# Patient Record
Sex: FEMALE | Race: WHITE
[De-identification: names, ages, dates, MRNs, and addresses within clinical notes are randomized per-mention and may not be internally consistent; named-entity substitution may affect disease eponyms.]

---

## 2017-02-09 ENCOUNTER — HOSPITAL ENCOUNTER (OUTPATIENT)
Dept: HOSPITAL 53 - M ED | Age: 62
Setting detail: OBSERVATION
LOS: 1 days | Discharge: HOME | End: 2017-02-10
Attending: INTERNAL MEDICINE | Admitting: INTERNAL MEDICINE
Payer: MEDICARE

## 2017-02-09 ENCOUNTER — HOSPITAL ENCOUNTER (OUTPATIENT)
Dept: HOSPITAL 53 - M LAB REF | Age: 62
End: 2017-02-09
Attending: INTERNAL MEDICINE
Payer: MEDICARE

## 2017-02-09 VITALS — DIASTOLIC BLOOD PRESSURE: 73 MMHG | SYSTOLIC BLOOD PRESSURE: 149 MMHG

## 2017-02-09 VITALS — HEIGHT: 65 IN | BODY MASS INDEX: 18.7 KG/M2 | WEIGHT: 112.22 LBS

## 2017-02-09 VITALS — DIASTOLIC BLOOD PRESSURE: 50 MMHG | SYSTOLIC BLOOD PRESSURE: 147 MMHG

## 2017-02-09 VITALS — SYSTOLIC BLOOD PRESSURE: 147 MMHG | DIASTOLIC BLOOD PRESSURE: 50 MMHG

## 2017-02-09 VITALS — SYSTOLIC BLOOD PRESSURE: 155 MMHG | DIASTOLIC BLOOD PRESSURE: 70 MMHG

## 2017-02-09 DIAGNOSIS — I12.9: ICD-10-CM

## 2017-02-09 DIAGNOSIS — N17.9: ICD-10-CM

## 2017-02-09 DIAGNOSIS — Z94.0: ICD-10-CM

## 2017-02-09 DIAGNOSIS — E78.4: ICD-10-CM

## 2017-02-09 DIAGNOSIS — E11.9: ICD-10-CM

## 2017-02-09 DIAGNOSIS — J20.2: Primary | ICD-10-CM

## 2017-02-09 DIAGNOSIS — D63.1: ICD-10-CM

## 2017-02-09 DIAGNOSIS — I48.2: ICD-10-CM

## 2017-02-09 DIAGNOSIS — Z94.0: Primary | ICD-10-CM

## 2017-02-09 DIAGNOSIS — Z79.899: ICD-10-CM

## 2017-02-09 DIAGNOSIS — N18.9: ICD-10-CM

## 2017-02-09 DIAGNOSIS — Z79.4: ICD-10-CM

## 2017-02-09 LAB
ANION GAP SERPL CALC-SCNC: 10 MEQ/L (ref 8–16)
BASOPHILS # BLD AUTO: 0 K/MM3 (ref 0–0.2)
BASOPHILS NFR BLD AUTO: 0.3 % (ref 0–1)
BUN SERPL-MCNC: 23 MG/DL (ref 7–18)
CALCIUM SERPL-MCNC: 9 MG/DL (ref 8.5–10.1)
CHLORIDE SERPL-SCNC: 107 MEQ/L (ref 98–107)
CO2 SERPL-SCNC: 23 MEQ/L (ref 21–32)
CREAT SERPL-MCNC: 1.17 MG/DL (ref 0.55–1.02)
EOSINOPHIL # BLD AUTO: 0.1 K/MM3 (ref 0–0.5)
EOSINOPHIL NFR BLD AUTO: 1.2 % (ref 0–3)
ERYTHROCYTE [DISTWIDTH] IN BLOOD BY AUTOMATED COUNT: 12.9 % (ref 11.5–14.5)
GFR SERPL CREATININE-BSD FRML MDRD: 50.4 ML/MIN/{1.73_M2} (ref 51–?)
GLUCOSE SERPL-MCNC: 226 MG/DL (ref 70–105)
LARGE UNSTAINED CELL #: 0.4 K/MM3 (ref 0–0.4)
LARGE UNSTAINED CELL %: 4.4 % (ref 0–4)
LYMPHOCYTES # BLD AUTO: 3 K/MM3 (ref 1.5–4.5)
LYMPHOCYTES NFR BLD AUTO: 29.4 % (ref 24–44)
MCH RBC QN AUTO: 30.8 PG (ref 27–33)
MCHC RBC AUTO-ENTMCNC: 32 G/DL (ref 32–36.5)
MCV RBC AUTO: 96.4 FL (ref 80–96)
MONOCYTES # BLD AUTO: 0.8 K/MM3 (ref 0–0.8)
MONOCYTES NFR BLD AUTO: 7.9 % (ref 0–5)
NEUTROPHILS # BLD AUTO: 5.7 K/MM3 (ref 1.8–7.7)
NEUTROPHILS NFR BLD AUTO: 56.8 % (ref 36–66)
PLATELET # BLD AUTO: 242 K/MM3 (ref 150–450)
POTASSIUM SERPL-SCNC: 4.7 MEQ/L (ref 3.5–5.1)
SODIUM SERPL-SCNC: 140 MEQ/L (ref 136–145)
WBC # BLD AUTO: 10.1 K/MM3 (ref 4–10)

## 2017-02-09 PROCEDURE — 83735 ASSAY OF MAGNESIUM: CPT

## 2017-02-09 PROCEDURE — 87581 M.PNEUMON DNA AMP PROBE: CPT

## 2017-02-09 PROCEDURE — 87205 SMEAR GRAM STAIN: CPT

## 2017-02-09 PROCEDURE — 71020: CPT

## 2017-02-09 PROCEDURE — 85027 COMPLETE CBC AUTOMATED: CPT

## 2017-02-09 PROCEDURE — 87077 CULTURE AEROBIC IDENTIFY: CPT

## 2017-02-09 PROCEDURE — 87798 DETECT AGENT NOS DNA AMP: CPT

## 2017-02-09 PROCEDURE — 80197 ASSAY OF TACROLIMUS: CPT

## 2017-02-09 PROCEDURE — 94640 AIRWAY INHALATION TREATMENT: CPT

## 2017-02-09 PROCEDURE — 82550 ASSAY OF CK (CPK): CPT

## 2017-02-09 PROCEDURE — 87070 CULTURE OTHR SPECIMN AEROBIC: CPT

## 2017-02-09 PROCEDURE — 87880 STREP A ASSAY W/OPTIC: CPT

## 2017-02-09 PROCEDURE — 87186 SC STD MICRODIL/AGAR DIL: CPT

## 2017-02-09 PROCEDURE — 80048 BASIC METABOLIC PNL TOTAL CA: CPT

## 2017-02-09 PROCEDURE — 87040 BLOOD CULTURE FOR BACTERIA: CPT

## 2017-02-09 PROCEDURE — 87633 RESP VIRUS 12-25 TARGETS: CPT

## 2017-02-09 PROCEDURE — 99284 EMERGENCY DEPT VISIT MOD MDM: CPT

## 2017-02-09 PROCEDURE — 96374 THER/PROPH/DIAG INJ IV PUSH: CPT

## 2017-02-09 PROCEDURE — 71250 CT THORAX DX C-: CPT

## 2017-02-09 PROCEDURE — 82553 CREATINE MB FRACTION: CPT

## 2017-02-09 PROCEDURE — 87804 INFLUENZA ASSAY W/OPTIC: CPT

## 2017-02-09 PROCEDURE — 85025 COMPLETE CBC W/AUTO DIFF WBC: CPT

## 2017-02-09 PROCEDURE — 84484 ASSAY OF TROPONIN QUANT: CPT

## 2017-02-09 PROCEDURE — 83605 ASSAY OF LACTIC ACID: CPT

## 2017-02-09 PROCEDURE — 87486 CHLMYD PNEUM DNA AMP PROBE: CPT

## 2017-02-09 PROCEDURE — 36415 COLL VENOUS BLD VENIPUNCTURE: CPT

## 2017-02-09 RX ADMIN — SODIUM CHLORIDE SCH UNITS: 4.5 INJECTION, SOLUTION INTRAVENOUS at 22:00

## 2017-02-09 RX ADMIN — SODIUM CHLORIDE SCH UNITS: 4.5 INJECTION, SOLUTION INTRAVENOUS at 14:00

## 2017-02-09 RX ADMIN — MAGNESIUM OXIDE SCH MG: 400 TABLET ORAL at 22:07

## 2017-02-09 RX ADMIN — IPRATROPIUM BROMIDE AND ALBUTEROL SULFATE SCH ML: .5; 3 SOLUTION RESPIRATORY (INHALATION) at 19:31

## 2017-02-09 RX ADMIN — IPRATROPIUM BROMIDE AND ALBUTEROL SULFATE SCH ML: .5; 3 SOLUTION RESPIRATORY (INHALATION) at 23:45

## 2017-02-09 RX ADMIN — METOPROLOL TARTRATE SCH MG: 25 TABLET, FILM COATED ORAL at 22:07

## 2017-02-09 RX ADMIN — AMOXICILLIN AND CLAVULANATE POTASSIUM SCH MG: 500; 125 TABLET, FILM COATED ORAL at 22:05

## 2017-02-09 RX ADMIN — ACETAMINOPHEN PRN MG: 325 TABLET ORAL at 22:06

## 2017-02-09 RX ADMIN — Medication SCH EA: at 23:08

## 2017-02-09 RX ADMIN — INSULIN LISPRO SCH UNITS: 100 INJECTION, SOLUTION INTRAVENOUS; SUBCUTANEOUS at 19:05

## 2017-02-09 RX ADMIN — TACROLIMUS SCH MG: 1 CAPSULE ORAL at 22:06

## 2017-02-09 RX ADMIN — IPRATROPIUM BROMIDE AND ALBUTEROL SULFATE SCH ML: .5; 3 SOLUTION RESPIRATORY (INHALATION) at 16:00

## 2017-02-09 RX ADMIN — Medication SCH EA: at 19:25

## 2017-02-09 NOTE — EDDOCDS
Physician Documentation                                                                           

[facility]                                                                         

Name: Liliana Haas                                                                                 

Age: 61 yrs                                                                                       

Sex: Female                                                                                       

: 1955                                                                                   

MRN: [id number]                                                                                     

Arrival Date: 2017                                                                          

Time: 10:11                                                                                       

Account#: [id number]                                                                              

Bed 8                                                                                             

Private MD: Sherry Cook P                                                                     

Disposition:                                                                                      

17 12:33 Hospitalization ordered by Estrada Rich for Inpatient Admission. Preliminary       

diagnosis are Acute pharyngitis, Fever, unspecified.                                            

- Bed requested for 4 [place].                                                                  

- Status is Inpatient Admission.                                                              ml6 

- Condition is Stable.                                                                            

- Problem is new.                                                                                 

- Symptoms have improved.                                                                         

                                                                                                  

                                                                                                  

                                                                                                  

Historical:                                                                                       

- Allergies: Halcion (hallucinations); Nifedipine (heart races); HYDRALAZINE (Unknown);           

- Home Meds:                                                                                      

1. tacrolimus 1 mg oral cap every 12 hours                                                      

     (Last dose: 2017 10:00)                                                                

2. Myfortic 180 mg oral TbEC twice a day                                                        

     (Last dose: 2017 10:00)                                                                

3. prednisone 5 mg Oral tab once daily                                                          

     (Last dose: 2017 10:00)                                                                

4. metoprolol tartrate 25 mg Oral tab 1 tab 2 times per day                                     

     (Last dose: 2017 10:00)                                                                

5. magnesium oxide 400 mg Oral cap 400 mg twice a day                                           

     (Last dose: 2017 10:00)                                                                

6. losartan oral 20 mg oral 1 tab once daily                                                    

     (Last dose: 2017 10:00)                                                                

7. Humalog 100 unit/mL Sub-Q crtg sliding scale TID 7 units at 0720                             

8. Lantus 100 unit/mL Sub-Q soln between 12-14 units at bedtime depending on her FSBS .         

     14 units kast PM                                                                             

- PMHx: Diabetes - IDDM: controlled; Hypertension; kidney transplant; end stage renal             

disease; ruptured pancreas;                                                                     

- PSHx: Rotator Cuff Repair- Left; Kidney Transplant- Left; Kidney Transplant- Right;             

Hip Arthroplasty, Left; Hip Arthroplasty, Right; Cataract Surgery- Bilateral;                   

Cholecystectomy; Splenectomy;                                                                   

- Social history: Smoking status: Patient uses tobacco products, light tobacco smoker.            

No barriers to communication noted, The patient speaks fluent English.                          

- Family history: Not pertinent.                                                                  

- : The pt / caregiver states he / she is not on anticoagulants. Home medication list             

is obtained from the patient.                                                                   

- Exposure Risk Screening:: None identified.                                                      

                                                                                                  

                                                                                                  

Vital Signs:                                                                                      

                                                                                             

10:12  / 97; Pulse 67; Resp 16; Temp 98.7(O); Pulse Ox 98% on R/A; Weight 53.52 kg /    elp 

      117.99 lbs (R); Height 5 ft. 5 in. (165.10 cm) (R);                                         

12:23  / 81; Pulse 62; Resp 18; Temp 98.9(O); Pulse Ox 98% on R/A; Pain 0/10;           srm 

14:19  / 74; Pulse 62; Resp 16; Temp 98.3(O); Pulse Ox 98% on R/A; Pain 0/10;           ml6 

10:12 Body Mass Index 19.64 (53.52 kg, 165.10 cm)                                             elp 

                                                                                                  

MDM:                                                                                              

10:33 -Blood Culture (Adults Only), peripheral from different site, or from device/port/PICC  fg  

      etc. if present ordered.                                                                    

10:33 Obtain sample by nasal aspiration ordered.                                              fg  

10:33 IV Saline Lock ordered.                                                                 fg  

10:35 CBC with Diff Ordered.                                                                  EDMS

10:35 Basic Metabolic Profile Ordered.                                                        EDMS

10:35 -Blood Culture Ordered.                                                                 EDMS

10:35 -Influenza A&B Rapid Antigen - Nose Ordered.                                            EDMS

10:35 Chest, 2 View (pa\E\lat) Ordered.                                                         EDMS

11:03 -Blood Culture (Adults Only), peripheral from different site, or from device/port/PICC  deg 

      etc. if present complete.                                                                   

11:04 BLOOD CULTURES Ordered.                                                                 EDMS

11:40 Strep Screen, Nursing ordered.                                                          fg  

11:51 Financial registration complete.                                                        lg  

12:05 [facility] Payment Agreement was scanned into [employer] and attached to record.               lg  

12:13 azithromycin 500 mg IVPB once over 1 hrs; dilute in 250mL of D5W or NS ordered.         fg  

12:27 Lactic Acid (Gray tube on ice) Ordered.                                                 EDMS

13:24 Admission / Observation Status ordered.                                                 EDMS

13:24 CONSISTENT CARBOHYDRATES ordered.                                                       EDMS

13:24 2 GRAM SODIUM DIET ordered.                                                             EDMS

14:19 CT Chest without contrast Ordered.                                                      EDMS

14:20 RESPIRATORY PANEL Ordered.                                                              EDMS

14:20 SPUTUM CULTURE AND GRAM STAIN Ordered.                                                  EDMS

14:26 CARDIAC INJURY PROFILE Ordered.                                                         EDMS

14:26 CARDIAC INJURY PROFILE Ordered.                                                         EDMS

14:26 TROPONIN Ordered.                                                                       EDMS

14:26 TROPONIN Ordered.                                                                       EDMS

                                                                                                  

Administered Medications:                                                                         

12:23 Drug: azithromycin 500 mg [azithromycin 500 mg intravenous solution] Route: IVPB;       srm 

      Infused Over: 1 hrs; Site: right forearm;                                                   

                                                                                                  

                                                                                                  

Signatures:                                                                                       

Dispatcher [employer]                           EDCheryl Purvis, Unit Clerk              Unit deg                                                  

Deepika Lcuio RN RN kpj Ganter, LoriLee, Reg                    Reg  lg                                                   

Matthew Acuña RN RN   ml6                                                  

Iram Blackwell MD MD                                                      

Zunilda Villa RN   srm                                                  

                                                                                                  

The chart was reviewed and I authenticate all verbal orders and agree with the evaluation and 
treatment provided.Attachments:

12:05 [facility] Payment Agreement                                                                lg  

                                                                                                  

**************************************************************************************************

MTDD

## 2017-02-09 NOTE — REP
CT of the chest without IV contrast:

 

Comparison is the chest CT dated 06/05/2012 as well as the PA and lateral plain

film study of the chest performed earlier today.

 

There are two calcific pleural plaques in the left hemithorax:

One along the left lateral chest wall measuring 5.1 cm craniocaudad by 2.9 cm

transverse,   , not significantly changed from the prior study.

The other is along the posterior lateral left chest wall measuring 4.6 cm

craniocaudad by 4.7 cm transverse, not significantly changed.

 

On both of these pleural masses are calcified with rim calcification and internal

calcification.  Additionally there is low density soft tissues within these

masses measuring 13 HU compatible with fluid, compatible with cysts.

 

On the prior study.  There were bilateral pleural effusions.  These have

resolved.

 

There are linear densities in the lower lobes suggestive of atelectasis versus

scarring.

 

There is a 4 mm pleural-based nodule posteriorly in the right upper lobe on image

38.  This area was obscured by the pleural effusion previously.

 

No other nodules are identified.

 

There are is borderline mediastinal adenopathy.  The azygos node measures 8 mm

diameter of the normal fatty hilus.  This is normal size for this node.

 

There is no axillary adenopathy.  The absence of IV contrast the study is

insensitive for hilar adenopathy.

 

The ascending thoracic aorta measures 4 cm diameter and is mildly dilated.  This

is not significantly changed.  Cardiac size is normal.  There is no pericardial

effusion.

 

The visualized upper abdominal upper abdomen demonstrates marked bilateral renal

cortical atrophy and bilateral renal calculi.  The visualized hepatic parenchyma

is unremarkable.  There is a cholecystectomy.  There are calcifications within

the onofre hepatis, likely within the common duct, unchanged from the prior

study.

 

The patient poorly has a splenectomy.  There is no adrenal mass.

 

Impression:

 

Stable calcified pleural masses on the left as described with low-density

centers.  These could be calcified pleural cysts are to be pleural masses and

cystic degeneration.

 

Linear densities in the lung bases which could represent atelectasis or

scarring.

 

There is a 4 mm pleural-based nodule posteriorly in the right upper lobe on image

38.  Follow-up for a nodules this size in a low risk patient is follow-up CT at

12 months, if no change and no further imaging needed.  For high risk patient

initial follow-up at 6-12 months and 918-24 months.  There are no changes.

 

Cholecystectomy.  Calcifications in the onofre hepatis, possibly within the common

biliary duct, but unchanged from the comparison study.

 

 

 

 

Signed by

Dorian Victor MD 02/09/2017 03:32 P

## 2017-02-09 NOTE — EDDOCDS
Nurse's Notes                                                                                     

[facility]                                                                         

Name: Liliana Haas                                                                                 

Age: 61 yrs                                                                                       

Sex: Female                                                                                       

: 1955                                                                                   

MRN: [id number]                                                                                     

Arrival Date: 2017                                                                          

Time: 10:11                                                                                       

Account#: [id number]                                                                              

Bed 8                                                                                             

Private MD: Sherry Cook P                                                                     

Diagnosis: Acute pharyngitis;Fever, unspecified                                                   

                                                                                                  

Presentation:                                                                                     

                                                                                             

10:16 Presenting complaint: Patient states: cough congestion and fever x 1 week,is a kidney   [facility] 

      transplant pt. Dr Cook sent pt here for admission. Adult Sepsis Screening: The            

      patient does not have new or worsening altered mentation. Patient's respiratory rate is     

      less than 22. Systolic blood pressure is greater than 100. Patient has a qSOFA score of     

      0- Negative Sepsis Screen. Suicide/Homicide risk assessment- the patient denies having      

      any suicidal and/or homicidal ideations and does not present with any other emotional,      

      behavioral or mental health complaints.  Status: Patient is not a service           

      member or  dependent. Transition of care: patient was received from Nephrology      

      office.                                                                                     

10:16 Acuity: HENRY Level 3                                                                     [facility] 

10:16 Method Of Arrival: Walkin/Carried/Asstd                                                 [facility] 

                                                                                                  

Triage Assessment:                                                                                

10:30 General: Appears in no apparent distress, Behavior is appropriate for age, pleasant.    j 

      Pain: Denies pain. Pt Declines HIV testing. Neurological: Level of Consciousness is         

      awake, alert, Oriented to person, place, time. Respiratory: Airway is patent                

      Respiratory effort is even, unlabored, Respiratory pattern is regular, symmetrical,         

      Reports cough that is non-productive. Derm: Skin is pink, warm & dry.                     

                                                                                                  

Historical:                                                                                       

- Allergies: Halcion (hallucinations); Nifedipine (heart races); HYDRALAZINE (Unknown);           

- Home Meds:                                                                                      

1. tacrolimus 1 mg oral cap every 12 hours                                                      

     (Last dose: 2017 10:00)                                                                

2. Myfortic 180 mg oral TbEC twice a day                                                        

     (Last dose: 2017 10:00)                                                                

3. prednisone 5 mg Oral tab once daily                                                          

     (Last dose: 2017 10:00)                                                                

4. metoprolol tartrate 25 mg Oral tab 1 tab 2 times per day                                     

     (Last dose: 2017 10:00)                                                                

5. magnesium oxide 400 mg Oral cap 400 mg twice a day                                           

     (Last dose: 2017 10:00)                                                                

6. losartan oral 20 mg oral 1 tab once daily                                                    

     (Last dose: 2017 10:00)                                                                

7. Humalog 100 unit/mL Sub-Q crtg sliding scale TID 7 units at 0720                             

8. Lantus 100 unit/mL Sub-Q soln between 12-14 units at bedtime depending on her FSBS .         

     14 units kast PM                                                                             

- PMHx: Diabetes - IDDM: controlled; Hypertension; kidney transplant; end stage renal             

disease; ruptured pancreas;                                                                     

- PSHx: Rotator Cuff Repair- Left; Kidney Transplant- Left; Kidney Transplant- Right;             

Hip Arthroplasty, Left; Hip Arthroplasty, Right; Cataract Surgery- Bilateral;                   

Cholecystectomy; Splenectomy;                                                                   

- Social history: Smoking status: Patient uses tobacco products, light tobacco smoker.            

No barriers to communication noted, The patient speaks fluent English.                          

- Family history: Not pertinent.                                                                  

- : The pt / caregiver states he / she is not on anticoagulants. Home medication list             

is obtained from the patient.                                                                   

- Exposure Risk Screening:: None identified.                                                      

                                                                                                  

                                                                                                  

Screenin:03 Screening information is obtained from the patient. Fall risk: No risks identified.     ml6 

      Assistance ADL's: requires no assistance with activities of daily living. Abuse/DV          

      Screen: The patient / caregiver reports he/she is: not in a situation that causes fear,     

      pain or injury. Nutritional screening: No deficits noted. Advance Directives:               

      Currently, there is no health care proxy. home support is adequate.                         

                                                                                                  

Assessment:                                                                                       

11:19 General: Appears in no apparent distress, comfortable, Behavior is appropriate for age, jo3 

      cooperative, pleasant. Neurological: Level of Consciousness is awake, alert, Oriented       

      to person, place, time. Cardiovascular: No deficits noted. Respiratory: Airway is           

      patent Respiratory effort is even, unlabored. Derm: Skin is pink, warm & dry.             

11:32 General: Appears in no apparent distress, comfortable, Behavior is appropriate for age, ml6 

      cooperative. Pain: Denies pain. Neurological: No deficits noted. Level of Consciousness     

      is awake, alert, Oriented to person, place, time. Cardiovascular: No deficits noted.        

      Capillary refill < 3 seconds is brisk in bilateral fingers toes Heart tones S1 S2           

      present Edema is absent. Pulses are all present.                                            

12:23 General: Appears in no apparent distress, comfortable, Behavior is appropriate for age, ml6 

      cooperative. Pain: Denies pain. Neurological: No deficits noted. Level of Consciousness     

      is awake, alert, Oriented to person, place, time. Cardiovascular: No deficits noted.        

      Capillary refill < 3 seconds is brisk in bilateral fingers toes Heart tones S1 S2           

      present Edema is absent. Pulses are all present. Respiratory: No deficits noted. Airway     

      is patent Respiratory effort is even, unlabored, Respiratory pattern is regular,            

      symmetrical, Breath sounds are clear bilaterally.                                           

13:30 Reassessment: Patient appears in no apparent distress at this time. Patient denies pain ml6 

      at this time. Patient states feeling better. Patient states symptoms have improved.         

14:17 General: Appears in no apparent distress, comfortable, Behavior is appropriate for age, ml6 

      cooperative. Pain: Denies pain. Neurological: No deficits noted. Level of Consciousness     

      is awake, alert, Oriented to person, place, time. Cardiovascular: No deficits noted.        

      Capillary refill < 3 seconds is brisk in bilateral fingers toes. Respiratory: No            

      deficits noted. Airway is patent Respiratory effort is even, unlabored, Respiratory         

      pattern is regular, symmetrical, Breath sounds are clear. GI: No deficits noted.            

      Abdomen is flat, non- distended Bowel sounds present X 4 quads.                             

                                                                                                  

Vital Signs:                                                                                      

10:12  / 97; Pulse 67; Resp 16; Temp 98.7(O); Pulse Ox 98% on R/A; Weight 53.52 kg (R); elp 

      Height 5 ft. 5 in. (165.10 cm) (R);                                                         

12:23  / 81; Pulse 62; Resp 18; Temp 98.9(O); Pulse Ox 98% on R/A; Pain 0/10;           srm 

14:19  / 74; Pulse 62; Resp 16; Temp 98.3(O); Pulse Ox 98% on R/A; Pain 0/10;           ml6 

10:12 Body Mass Index 19.64 (53.52 kg, 165.10 cm)                                             elp 

                                                                                                  

Vitals:                                                                                           

10:12 Log In Time: 2017 at 10:09.                                                elp 

12:03 Strep Screen is obtained and tested: Positive.                                          ml6 

                                                                                                  

ED Course:                                                                                        

10:11 Patient visited by Mer Venegas PCA.                                                  elp 

10:11 Patient moved to Waiting                                                                elp 

10:12 Shrery Cook is Private Physician.                                                    elp 

10:14 Patient visited by Patchen, Mer, PCA.                                                  elp 

10:14 Patient moved to Pre RCE                                                                elp 

10:18 Triage Initiated                                                                        kpj 

10:32 Patient moved to 8                                                                      [facility] 

11:03 Iram Blackwell MD is Attending Physician.                                               fg  

11:03 Patient visited by Iram Blackwell MD.                                                   fg  

11:16 Patient visited by Matthew Acuña, RN.                                                   ml6 

11:17 BLOOD CULTURES Sent.                                                                    ml6 

11:18 Inserted saline lock: 20 gauge in right forearm. Labs drawn. (by ED staff). Sent per    jo3 

      order to lab. Labs/Blood culture drawn.                                                     

11:19 Patient visited by Mirian Medina,[facility].                                                jo3 

12:03 Patient visited by Matthew Acuña, RN.                                                   ml6 

12:03 The patient / caregiver is instructed regarding the plan of care and ED course.         ml6 

12:05 [facility] Payment Agreement was scanned into [employer] and attached to record.               lg  

12:24 Patient visited by Zunilda Villa RN.                                                srm 

12:28 Estrada Rich is Hospitalizing Provider.                                                 fg  

12:31 Chest, 2 View (pa\E\lat) Returned.                                                        EDMS

12:59 Lactic Acid (Gray tube on ice) Sent.                                                    ct3 

14:09 No procedures done that require assistance.                                             ml6 

                                                                                                  

Administered Medications:                                                                         

12:23 Drug: azithromycin 500 mg [azithromycin 500 mg intravenous solution] Route: IVPB;       srm 

      Infused Over: 1 hrs; Site: right forearm;                                                   

                                                                                                  

                                                                                                  

Order Results:                                                                                    

Lab Order: CBC with Diff; SPEC'M 17 10:59                                                   

      Test: WHITE BLOOD COUNT; Value: 10.1; Range: 4.0-10.0; Abnormal: Above high normal;         

      Units: K/mm3; Status: F                                                                     

      Test: RED BLOOD COUNT; Value: 4.71; Range: 4.00-5.40; Units: M/mm3; Status: F               

      Test: HEMOGLOBIN; Value: 14.5; Range: 12.0-16.0; Units: g/dl; Status: F                     

      Test: HEMATOCRIT; Value: 45.4; Range: 36.0-47.0; Units: %; Status: F                        

      Test: MEAN CORPUSCULAR VOLUME; Value: 96.4; Range: 80.0-96.0; Abnormal: Above high          

      normal; Units: fl; Status: F                                                                

      Test: MEAN CORPUSCULAR HEMOGLOBIN; Value: 30.8; Range: 27.0-33.0; Units: pg; Status: F      

      Test: MEAN CORPUSCULAR HGB CONC; Value: 32.0; Range: 32.0-36.5; Units: g/dl; Status: F      

      Test: RED CELL DISTRIBUTION WIDTH; Value: 12.9; Range: 11.5-14.5; Units: %; Status: F       

      Test: PLATELET COUNT, AUTOMATED; Value: 242; Range: 150-450; Units: k/mm3; Status: F        

      Test: NEUTROPHILS %; Value: 56.8; Range: 36.0-66.0; Units: %; Status: F                     

      Test: LYMPH %; Value: 29.4; Range: 24.0-44.0; Units: %; Status: F                           

      Test: MONO %; Value: 7.9; Range: 0.0-5.0; Abnormal: Above high normal; Units: %;            

      Status: F                                                                                   

      Test: EOS %; Value: 1.2; Range: 0.0-3.0; Units: %; Status: F                                

      Test: BASO %; Value: 0.3; Range: 0.0-1.0; Units: %; Status: F                               

      Test: LARGE UNSTAINED CELL %; Value: 4.4; Range: 0.0-4.0; Abnormal: Above high normal;      

      Units: %; Status: F                                                                         

      Test: NEUTROPHILS #; Value: 5.7; Range: 1.8-7.7; Units: K/mm3; Status: F                    

      Test: LYMPH #; Value: 3.0; Range: 1.5-4.5; Units: K/mm3; Status: F                          

      Test: MONO #; Value: 0.8; Range: 0.0-0.8; Units: K/mm3; Status: F                           

      Test: EOS #; Value: 0.1; Range: 0.0-0.50; Units: K/mm3; Status: F                           

      Test: BASO #; Value: 0.0; Range: 0.0-0.2; Units: K/mm3; Status: F                           

      Test: LARGE UNSTAINED CELL #; Value: 0.4; Range: 0.0-0.4; Units: K/mm3; Status: F           

Lab Order: Basic Metabolic Profile; SPEC' 17 10:59                                         

      Test: GLUCOSE, FASTING; Value: 226; Range: ; Abnormal: Above high normal; Units:      

      MG/DL; Status: F                                                                            

      Test: BLOOD UREA NITROGEN; Value: 23; Range: 7-18; Abnormal: Above high normal; Units:      

      MG/DL; Status: F                                                                            

      Test: CREATININE FOR GFR; Value: 1.17; Range: 0.55-1.02; Abnormal: Above high normal;       

      Units: MG/DL; Status: F                                                                     

      Test: GLOMERULAR FILTRATION RATE; Value: 50.4; Range: >51; Abnormal: Below low normal;      

      Status: F                                                                                   

      Test: SODIUM LEVEL; Value: 140; Range: 136-145; Units: MEQ/L; Status: F                     

      Test: POTASSIUM SERUM; Value: 4.7; Range: 3.5-5.1; Units: MEQ/L; Status: F                  

      Test: CHLORIDE LEVEL; Value: 107; Range: ; Units: MEQ/L; Status: F                    

      Test: CARBON DIOXIDE LEVEL; Value: 23; Range: 21-32; Units: MEQ/L; Status: F                

      Test: ANION GAP; Value: 10; Range: 8-16; Units: MEQ/L; Status: F                            

      Test: CALCIUM LEVEL; Value: 9.0; Range: 8.5-10.1; Units: MG/DL; Status: F                   

      Test Note: &nbsp;; Units are mL/min/1.73 m2 Chronic Kidney Disease Staging per NKF:       

      Stage I & II GFR >=60 Normal to Mildly Decreased Stage III GFR 30-59 Moderately           

      Decreased Stage IV GFR 15-29 Severely Decreased Stage V GFR <15 Very Little GFR Left        

      ESRD GFR <15 on RRT                                                                         

Lab Order: -Influenza A&B Rapid Antigen - Nose; SPEC'M 17 10:59                           

      Test: INFLUENZA A RAPID SCR by ICA; Value: INFLUENZA A RESULTS NEGATIVE; Status: F          

      Test: INFLUENZA A RAPID SCR by ICA; Value: Comments:; Status: F                             

      Test: INFLUENZA B RAPID SCR by ICA; Value: INFLUENZA B RESULTS NEGATIVE; Status: F          

      Test Note: &nbsp;; The Influenza test is a direct rapid immunoassay for the qualitative   

      detection of Influenza viral antigen. Cell culture (Viral Culture) testing should be        

      considered to confirm NEGATIVE results and to assist in detecting other viruses that        

      can provide similar clinical symptoms. Please contact the lab within 24 hours               

      ([phone] if confirmatory testing is desired.                                              

Lab Order: Lactic Acid (Gray tube on ice); SPEC'M 17 12:53                                  

      Test: LACTIC ACID SEPSIS PROTOCOL; Value: 0.7; Range: 0.4-2.0; Units: MMOL/L; Status: F     

                                                                                                  

Radiology Order: Chest, 2 View (pa\E\lat)                                                         

      Test: Chest, 2 View (pa\E\lat)                                                              

      REASON FOR EXAMINATION: Chest Pain;Cough; Chest x-ray: Two views.; ; History: Chest         

      pain and cough.; ; Comparison chest x-ray 2012. A chest CT is reviewed         

      from 2013.; ; Findings: There is a large pleural-based mass projecting       

      in the left; posterolateral chest wall superiorly unchanged from the 2012 prior study.      

      There; is peripherally calcified on chest CT from . There is also calcific pleural;     

      plaquing along the left lateral chest wall which is unchanged from the 2012 study; as       

      well. Left hemidiaphragm is slightly elevated as before with blunting of the; lateral       

      pleural angle. These changes are chronic. There are surgical clips; projecting in the       

      right breast soft tissues as before. The lungs are otherwise; clear. Pleural angles are     

      sharp posteriorly. Heart is not enlarged. The aorta; is slightly tortuous.; ;               

      Impression:; ; No active cardiopulmonary disease. Chronic calcific pleural plaquing and     

      chronic; calcific pleural mass lesion seen on the left.; ; ; Signed by; Rajendra Oneal MD     

      2017 12:25 P;                                                                         

Outcome:                                                                                          

12:33 Decision to Hospitalize by Provider.                                                    fg  

14:08 Discharge Assessment: patient administered narcotics - no. The following High Risk      ml6 

      Discharge criteria are identified: None. No special radiology studies were completed.       

      Admission hand-off: Report Faxed Fax receipt verified by [employer] [facility]. Property :Personal       

      belongings accompany Pt.                                                                    

14:09 Condition: stable.                                                                      ml6 

14:31 Patient left the ED.                                                                    ml6 

                                                                                                  

Signatures:                                                                                       

Dispatcher [employer]                           EDMS                                                 

Deepika Lucio RN RN kpj Michelson, Staci, RN RN srm Ganter, LoriLee, Robbi                    Reg  iMrian Drake RN RN jo3 Lowe, Matthew, RN RN   ml6                                                  

Blaire Oilvo, PCA                  PCA  ct3                                                  

Mer Venegas, PCA                      PCA  elp                                                  

Iram Blackwell MD MD   fg                                                   

                                                                                                  

Corrections: (The following items were deleted from the chart)                                    

: 12: General: Appears in no apparent distress, comfortable, Behavior is appropriate    ml6 

      for age, cooperative, srm                                                                   

: 12: Pain: Denies pain. srm                                                            ml6 

: 12: Neurological: No deficits noted. Level of Consciousness is awake, alert, Oriented ml6 

      to person, place, time, srm                                                                 

: 12: Cardiovascular: No deficits noted. Capillary refill < 3 seconds is brisk in       ml6 

      bilateral fingers toes Heart tones S1 S2 present Edema is absent. Pulses are all            

      present. srm                                                                                

: 12: Respiratory: No deficits noted. Airway is patent Respiratory effort is even,      ml6 

      unlabored, Respiratory pattern is regular, symmetrical, Breath sounds are clear             

      bilaterally. srm                                                                            

                                                                                                  

**************************************************************************************************

MTDD

## 2017-02-09 NOTE — REP
Chest x-ray:  Two views.

 

History:  Chest pain and cough.

 

Comparison chest x-ray December 29, 2012. A chest CT is reviewed from September 19, 2013.

 

Findings:  There is a large pleural-based mass projecting in the left

posterolateral chest wall superiorly unchanged from the 2012 prior study.  There

is peripherally calcified on chest CT from 2013.  There is also calcific pleural

plaquing along the left lateral chest wall which is unchanged from the 2012 study

as well.  Left hemidiaphragm is slightly elevated as before with blunting of the

lateral pleural angle.  These changes are chronic.  There are surgical clips

projecting in the right breast soft tissues as before.  The lungs are otherwise

clear.  Pleural angles are sharp posteriorly.  Heart is not enlarged.  The aorta

is slightly tortuous.

 

Impression:

 

No active cardiopulmonary disease.  Chronic calcific pleural plaquing and chronic

calcific pleural mass lesion seen on the left.

 

 

Signed by

Rajendra Oneal MD 02/09/2017 12:25 P

## 2017-02-09 NOTE — HPEPDOC
Medical History and Physical


Date of Admission


Feb 9, 2017 at 13:19





History and Physical


PRIMARY CARE PROVIDER:  





ATTENDING: Víctor Galdamez MD





CHIEF COMPLAINT: Cough/fevers





HISTORY OF PRESENT ILLNESS: 


This 61-year-old female past medical history of renal transplant 2 for end-

stage renal disease (ANCA vasculitis) anemia of chronic kidney disease, history 

of chronic A. fib on beta blocker and aspirin, hypertension, diabetes who 

presents from Dr. Bingham office with fevers and cough.





Patient states she's been having rectal cough of yellow sputum over the past 

week. Had fevers of 101. Sick contacts include her granddaughter who had 

influenza a last week. No chest pain/palpitations.





Patient also states that's her tongue is very erythematous and sensitive. No 

tongue swelling, difficulty breathing, dysphagia.





She has been told by Dr. Cook that her renal function is a bit worse recently

, given the combination of her current symptoms that he would like her to come 

to the hospital to be admitted.





PAST MEDICAL HISTORY: As per HPI





PAST SURGICAL HISTORY: Rotator cuff repair on the left, kidney transplant 2 

left and right; 1992 and 2014, bilateral hip arthroplasty, cataract surgery 

bilaterally, cholecystectomy, splenectomy, pancreatic rupture





SOCIAL HISTORY: Smokes half pack per day 40 years, occasional who called the 

weekends, no illicit drug use.





FAMILY HISTORY: Noncontributory





ALLERGIES: Please see below.





REVIEW OF SYSTEMS:


HEENT: Denies sore throat/headache


CARDIOVASCULAR: Denies chest pain/palpitations


RESPIRATORY: + shortness of breath/cough


GASTROINTESTINAL: denies nausea/vomiting


GENITOURINARY: Denies dysuria/urinary urgency.


MUSCULOSKELETAL: Denies myalgias/arthralgias


NEUROLOGICAL: Denies any focal weakness 


Rest of ROS negative. 





HOME MEDICATIONS: Please see below. 





PHYSICAL EXAMINATION:


Vitals: (see below) 


General: No acute distress, laying comfortably in bed.


HEENT: Moist mucous membranes. Tongue is red, not swollen. Pharyngeal erythema, 

no exudates. Airway patent. 


Neck: No JVD or lymphadenopathy


Cardiac: RRR, No murmurs


Pulm: Exp wheezing and rhonchi b/l. No crackles or stridor.


Abd: NT/ND + BS


Ext: No edema or cyanosis





LABORATORY DATA: See below.





IMAGING: 


CXR 2/9/17 Impression: No active cardiopulmonary disease.  Chronic calcific 

pleural plaquing and chronic calcific pleural mass lesion seen on the left.





MICROBIOLOGY: Please see below. 





ASSESSMENT/PLAN:


Acute bronchitis with positive strep in the ED- patient is having significant 

amount of wheezing. S/p azithromycin the ED. Will start a course of augmentin. 

Continue nebs, patient's home steroids, Mucinex.  IV fluids.  Cultures pending.





SABINA on CKD - history of kidney transplant 2 in 1992 in 2014. History of ANCA 

vasculitis.  We'll give gentle hydration. Avoid nephrotoxins. If renal function 

does not improve consider consulting nephrology. Cont pt's home meds.





IDDM - cont levemir/novolog, which pt takes at home.





HTN -  controlled. Cont home meds. 





DVT prophy - heparin SQ. 





Pt will be followed by Dr. Henrietta Gonzalez starting 2/10/17 at 7am.





Vital Signs


BP: 144/81,  HR: 62,  RR: 16,  Afebrile, O2 sat 98% RA.





Laboratory Data


Labs 24H


 Laboratory Tests 2


2/9/17 10:59: 


Anion Gap 10, White Blood Count 10.1H, Red Blood Count 4.71, Hemoglobin 14.5, 

Hematocrit 45.4, Mean Corpuscular Volume 96.4H, Mean Corpuscular Hemoglobin 30.8

, Mean Corpuscular Hemoglobin Concent 32.0, Red Cell Distribution Width 12.9, 

Platelet Count 242, Neutrophils (%) (Auto) 56.8, Lymphocytes (%) (Auto) 29.4, 

Monocytes (%) (Auto) 7.9H, Eosinophils (%) (Auto) 1.2, Basophils (%) (Auto) 0.3

, Neutrophils # (Auto) 5.7, Lymphocytes # (Auto) 3.0, Monocytes # (Auto) 0.8, 

Eosinophils # (Auto) 0.1, Basophils # (Auto) 0.0, Blood Urea Nitrogen 23H, 

Creatinine 1.17H, Sodium Level 140, Potassium Level 4.7, Chloride Level 107, 

Carbon Dioxide Level 23, Calcium Level 9.0, Glomerular Filtration Rate 50.4L, 

Large Unclassified Cells # 0.4, Large Unclassified Cells % 4.4H


2/9/17 12:53: Lactic Acid (Sepsis) 0.7


CBC/BMP


 Laboratory Tests


2/9/17 10:59








Calcium Level 9.0, Red Blood Count 4.71, Mean Corpuscular Volume 96.4 H, Mean 

Corpuscular Hemoglobin 30.8, Mean Corpuscular Hemoglobin Concent 32.0, Red Cell 

Distribution Width 12.9, Neutrophils (%) (Auto) 56.8, Lymphocytes (%) (Auto) 

29.4, Monocytes (%) (Auto) 7.9 H, Eosinophils (%) (Auto) 1.2, Basophils (%) (

Auto) 0.3, Neutrophils # (Auto) 5.7, Lymphocytes # (Auto) 3.0, Monocytes # (Auto

) 0.8, Eosinophils # (Auto) 0.1, Basophils # (Auto) 0.0


Microbiology





 Microbiology


2/9/17 Blood Culture, Received


         Pending


2/9/17 Blood Culture, Received


         Pending


2/9/17 Influenza Virus Type A Antigen - Final, Complete


         


2/9/17 Influenza Virus Type B Antigen - Final, Complete





Home Medications


Scheduled


(Mycophenolic Acid Dr) 180 Mg Tab 540 MG PO BID  


(Preservision Areds 2) 1 Cap Cap 1 CAP PO DAILY  


Insulin Glargine (Lantus Solostar) 100 Unit/Ml Inj 14 UNITS SC QHS  


   12-14 UNITS 


Insulin Human Lispro (Humalog) 1 Units/0.01 Ml Inj 1 DOSE SC AC  


Lactobacillus Acidophilus (Bacid) 1 Tab Tab 1 TAB PO DAILY  


Losartan Potassium (Losartan Potassium) 25 Mg Tab 25 MG PO DAILY  


Magnesium Oxide (Magnesium Oxide 400) 400 Mg Tab 400 MG PO BID  


Metoprolol Tartrate (Metoprolol Tartrate) 25 Mg Tab 25 MG PO BID  


Prednisone (Prednisone) 5 Mg Tab 5 MG PO DAILY  


Simvastatin (Simvastatin) 20 Mg Tab 20 MG PO QHS  


Tacrolimus (Tacrolimus) 1 Mg Cap 1 MG PO BID  





Scheduled PRN


Acetaminophen (Tylenol) 325 Mg Tab 650 MG PO Q4H PRN PRN PAIN 





Allergies


Coded Allergies:  


     Triazolam (Verified  Allergy, Intermediate, RASH, 4/8/13)


     Hydralazine (Verified  Allergy, Unknown, 4/8/13)


     Nifedipine (Verified  Adverse Reaction, Intermediate, TACHYCARDIA, 4/8/13)








VÍCTOR GALDAMEZ MD Feb 9, 2017 14:45

## 2017-02-10 VITALS — DIASTOLIC BLOOD PRESSURE: 79 MMHG | SYSTOLIC BLOOD PRESSURE: 146 MMHG

## 2017-02-10 VITALS — DIASTOLIC BLOOD PRESSURE: 86 MMHG | SYSTOLIC BLOOD PRESSURE: 126 MMHG

## 2017-02-10 VITALS — DIASTOLIC BLOOD PRESSURE: 82 MMHG | SYSTOLIC BLOOD PRESSURE: 137 MMHG

## 2017-02-10 VITALS — DIASTOLIC BLOOD PRESSURE: 78 MMHG | SYSTOLIC BLOOD PRESSURE: 140 MMHG

## 2017-02-10 VITALS — SYSTOLIC BLOOD PRESSURE: 126 MMHG | DIASTOLIC BLOOD PRESSURE: 86 MMHG

## 2017-02-10 LAB
ANION GAP SERPL CALC-SCNC: 10 MEQ/L (ref 8–16)
BUN SERPL-MCNC: 16 MG/DL (ref 7–18)
CALCIUM SERPL-MCNC: 8.6 MG/DL (ref 8.5–10.1)
CHLORIDE SERPL-SCNC: 115 MEQ/L (ref 98–107)
CO2 SERPL-SCNC: 19 MEQ/L (ref 21–32)
CREAT SERPL-MCNC: 0.98 MG/DL (ref 0.55–1.02)
ERYTHROCYTE [DISTWIDTH] IN BLOOD BY AUTOMATED COUNT: 12.8 % (ref 11.5–14.5)
GFR SERPL CREATININE-BSD FRML MDRD: > 60 ML/MIN/{1.73_M2} (ref 51–?)
GLUCOSE SERPL-MCNC: 167 MG/DL (ref 70–105)
MAGNESIUM SERPL-MCNC: 2.1 MG/DL (ref 1.8–2.4)
MCH RBC QN AUTO: 30.5 PG (ref 27–33)
MCHC RBC AUTO-ENTMCNC: 31.6 G/DL (ref 32–36.5)
MCV RBC AUTO: 96.7 FL (ref 80–96)
PLATELET # BLD AUTO: 263 K/MM3 (ref 150–450)
POTASSIUM SERPL-SCNC: 5 MEQ/L (ref 3.5–5.1)
SODIUM SERPL-SCNC: 144 MEQ/L (ref 136–145)
WBC # BLD AUTO: 7.4 K/MM3 (ref 4–10)

## 2017-02-10 RX ADMIN — MAGNESIUM OXIDE SCH MG: 400 TABLET ORAL at 10:15

## 2017-02-10 RX ADMIN — METOPROLOL TARTRATE SCH MG: 25 TABLET, FILM COATED ORAL at 10:15

## 2017-02-10 RX ADMIN — IPRATROPIUM BROMIDE AND ALBUTEROL SULFATE SCH ML: .5; 3 SOLUTION RESPIRATORY (INHALATION) at 15:39

## 2017-02-10 RX ADMIN — AMOXICILLIN AND CLAVULANATE POTASSIUM SCH MG: 500; 125 TABLET, FILM COATED ORAL at 10:14

## 2017-02-10 RX ADMIN — INSULIN LISPRO SCH UNITS: 100 INJECTION, SOLUTION INTRAVENOUS; SUBCUTANEOUS at 08:57

## 2017-02-10 RX ADMIN — SODIUM CHLORIDE SCH UNITS: 4.5 INJECTION, SOLUTION INTRAVENOUS at 13:44

## 2017-02-10 RX ADMIN — SODIUM CHLORIDE SCH UNITS: 4.5 INJECTION, SOLUTION INTRAVENOUS at 04:33

## 2017-02-10 RX ADMIN — TACROLIMUS SCH MG: 1 CAPSULE ORAL at 10:15

## 2017-02-10 RX ADMIN — ACETAMINOPHEN PRN MG: 325 TABLET ORAL at 08:56

## 2017-02-10 RX ADMIN — IPRATROPIUM BROMIDE AND ALBUTEROL SULFATE SCH ML: .5; 3 SOLUTION RESPIRATORY (INHALATION) at 11:39

## 2017-02-10 RX ADMIN — IPRATROPIUM BROMIDE AND ALBUTEROL SULFATE SCH ML: .5; 3 SOLUTION RESPIRATORY (INHALATION) at 08:05

## 2017-02-10 RX ADMIN — IPRATROPIUM BROMIDE AND ALBUTEROL SULFATE SCH ML: .5; 3 SOLUTION RESPIRATORY (INHALATION) at 03:46

## 2017-02-10 RX ADMIN — INSULIN LISPRO SCH UNITS: 100 INJECTION, SOLUTION INTRAVENOUS; SUBCUTANEOUS at 13:44

## 2017-02-11 NOTE — EDDOCDS
Physician Documentation                                                                           

[facility]                                                                         

Name: Liliana Haas                                                                                 

Age: 61 yrs                                                                                       

Sex: Female                                                                                       

: 1955                                                                                   

MRN: [id number]                                                                                     

Arrival Date: 2017                                                                          

Time: 10:11                                                                                       

Account#: [id number]                                                                              

Bed 8                                                                                             

Private MD: Sherry Cook P                                                                     

Disposition:                                                                                      

17 12:33 Hospitalization ordered by Estrada Rich for Inpatient Admission. Preliminary       

diagnosis are Acute pharyngitis, Fever, unspecified.                                            

- Bed requested for 4 [place].                                                                  

- Status is Inpatient Admission.                                                              ml6 

- Condition is Stable.                                                                            

- Problem is new.                                                                                 

- Symptoms have improved.                                                                         

                                                                                                  

                                                                                                  

                                                                                                  

Historical:                                                                                       

- Allergies: Halcion (hallucinations); Nifedipine (heart races); HYDRALAZINE (Unknown);           

- Home Meds:                                                                                      

1. tacrolimus 1 mg oral cap every 12 hours                                                      

     (Last dose: 2017 10:00)                                                                

2. Myfortic 180 mg oral TbEC twice a day                                                        

     (Last dose: 2017 10:00)                                                                

3. prednisone 5 mg Oral tab once daily                                                          

     (Last dose: 2017 10:00)                                                                

4. metoprolol tartrate 25 mg Oral tab 1 tab 2 times per day                                     

     (Last dose: 2017 10:00)                                                                

5. magnesium oxide 400 mg Oral cap 400 mg twice a day                                           

     (Last dose: 2017 10:00)                                                                

6. losartan oral 20 mg oral 1 tab once daily                                                    

     (Last dose: 2017 10:00)                                                                

7. Humalog 100 unit/mL Sub-Q crtg sliding scale TID 7 units at 0720                             

8. Lantus 100 unit/mL Sub-Q soln between 12-14 units at bedtime depending on her FSBS .         

     14 units kast PM                                                                             

- PMHx: Diabetes - IDDM: controlled; Hypertension; kidney transplant; end stage renal             

disease; ruptured pancreas;                                                                     

- PSHx: Rotator Cuff Repair- Left; Kidney Transplant- Left; Kidney Transplant- Right;             

Hip Arthroplasty, Left; Hip Arthroplasty, Right; Cataract Surgery- Bilateral;                   

Cholecystectomy; Splenectomy;                                                                   

- Social history: Smoking status: Patient uses tobacco products, light tobacco smoker.            

No barriers to communication noted, The patient speaks fluent English.                          

- Family history: Not pertinent.                                                                  

- : The pt / caregiver states he / she is not on anticoagulants. Home medication list             

is obtained from the patient.                                                                   

- Exposure Risk Screening:: None identified.                                                      

                                                                                                  

                                                                                                  

Vital Signs:                                                                                      

                                                                                             

10:12  / 97; Pulse 67; Resp 16; Temp 98.7(O); Pulse Ox 98% on R/A; Weight 53.52 kg /    elp 

      117.99 lbs (R); Height 5 ft. 5 in. (165.10 cm) (R);                                         

12:23  / 81; Pulse 62; Resp 18; Temp 98.9(O); Pulse Ox 98% on R/A; Pain 0/10;           srm 

14:19  / 74; Pulse 62; Resp 16; Temp 98.3(O); Pulse Ox 98% on R/A; Pain 0/10;           ml6 

10:12 Body Mass Index 19.64 (53.52 kg, 165.10 cm)                                             elp 

                                                                                                  

MDM:                                                                                              

10:33 -Blood Culture (Adults Only), peripheral from different site, or from device/port/PICC  fg  

      etc. if present ordered.                                                                    

10:33 Obtain sample by nasal aspiration ordered.                                              fg  

10:33 IV Saline Lock ordered.                                                                 fg  

10:35 CBC with Diff Ordered.                                                                  EDMS

10:35 Basic Metabolic Profile Ordered.                                                        EDMS

10:35 -Blood Culture Ordered.                                                                 EDMS

10:35 -Influenza A&B Rapid Antigen - Nose Ordered.                                            EDMS

10:35 Chest, 2 View (pa\E\lat) Ordered.                                                         EDMS

11:03 -Blood Culture (Adults Only), peripheral from different site, or from device/port/PICC  deg 

      etc. if present complete.                                                                   

11:04 BLOOD CULTURES Ordered.                                                                 EDMS

11:40 Strep Screen, Nursing ordered.                                                          fg  

11:51 Financial registration complete.                                                        lg  

12:05 [facility] Payment Agreement was scanned into [employer] and attached to record.               lg  

12:13 azithromycin 500 mg IVPB once over 1 hrs; dilute in 250mL of D5W or NS ordered.         fg  

12:27 Lactic Acid (Gray tube on ice) Ordered.                                                 EDMS

13:24 Admission / Observation Status ordered.                                                 EDMS

13:24 CONSISTENT CARBOHYDRATES ordered.                                                       EDMS

13:24 2 GRAM SODIUM DIET ordered.                                                             EDMS

14:19 CT Chest without contrast Ordered.                                                      EDMS

14:20 RESPIRATORY PANEL Ordered.                                                              EDMS

14:20 SPUTUM CULTURE AND GRAM STAIN Ordered.                                                  EDMS

14:26 CARDIAC INJURY PROFILE Ordered.                                                         EDMS

14:26 CARDIAC INJURY PROFILE Ordered.                                                         EDMS

14:26 TROPONIN Ordered.                                                                       EDMS

14:26 TROPONIN Ordered.                                                                       EDMS

15:21 T-Sheet-- Draft Copy was scanned into [employer] and attached to record.                   gb  

                                                                                                  

Administered Medications:                                                                         

12:23 Drug: azithromycin 500 mg [azithromycin 500 mg intravenous solution] Route: IVPB;       srm 

      Infused Over: 1 hrs; Site: right forearm;                                                   

                                                                                                  

                                                                                                  

Signatures:                                                                                       

Dispatcher [employer]                           EDMS                                                 

Cheryl Zavaleta, Unit Clerk              Unit deg                                                  

Deepika Lucio RN                       RN   kpNataliia Luo, Reg                  Reg  gb                                                   

Jose Martell, Reg                    Reg  lg                                                   

Matthew Acuña RN                       RN   ml6                                                  

Iram Blackwell MD MD                                                      

Zunilda Villa RN   srm                                                  

                                                                                                  

The chart was reviewed and I authenticate all verbal orders and agree with the evaluation and 
treatment provided.Attachments:

12:05 [facility] Payment Agreement                                                                lg  

15:21 T-Sheet-- Draft Copy                                                                    gb  

                                                                                                  

**************************************************************************************************



*** Chart Complete ***
MTDD

## 2017-02-11 NOTE — EDDOCDS
Physician Documentation                                                                           

[facility]                                                                         

Name: Liliana Haas                                                                                 

Age: 61 yrs                                                                                       

Sex: Female                                                                                       

: 1955                                                                                   

MRN: [id number]                                                                                     

Arrival Date: 2017                                                                          

Time: 10:11                                                                                       

Account#: [id number]                                                                              

Bed 8                                                                                             

Private MD: Sherry Cook P                                                                     

Disposition:                                                                                      

17 12:33 Hospitalization ordered by Estrada Rich for Inpatient Admission. Preliminary       

diagnosis are Acute pharyngitis, Fever, unspecified.                                            

- Bed requested for 4 [employer].                                                                  

- Status is Inpatient Admission.                                                              ml6 

- Condition is Stable.                                                                            

- Problem is new.                                                                                 

- Symptoms have improved.                                                                         

                                                                                                  

                                                                                                  

                                                                                                  

Historical:                                                                                       

- Allergies: Halcion (hallucinations); Nifedipine (heart races); HYDRALAZINE (Unknown);           

- Home Meds:                                                                                      

1. tacrolimus 1 mg oral cap every 12 hours                                                      

     (Last dose: 2017 10:00)                                                                

2. Myfortic 180 mg oral TbEC twice a day                                                        

     (Last dose: 2017 10:00)                                                                

3. prednisone 5 mg Oral tab once daily                                                          

     (Last dose: 2017 10:00)                                                                

4. metoprolol tartrate 25 mg Oral tab 1 tab 2 times per day                                     

     (Last dose: 2017 10:00)                                                                

5. magnesium oxide 400 mg Oral cap 400 mg twice a day                                           

     (Last dose: 2017 10:00)                                                                

6. losartan oral 20 mg oral 1 tab once daily                                                    

     (Last dose: 2017 10:00)                                                                

7. Humalog 100 unit/mL Sub-Q crtg sliding scale TID 7 units at 0720                             

8. Lantus 100 unit/mL Sub-Q soln between 12-14 units at bedtime depending on her FSBS .         

     14 units kast PM                                                                             

- PMHx: Diabetes - IDDM: controlled; Hypertension; kidney transplant; end stage renal             

disease; ruptured pancreas;                                                                     

- PSHx: Rotator Cuff Repair- Left; Kidney Transplant- Left; Kidney Transplant- Right;             

Hip Arthroplasty, Left; Hip Arthroplasty, Right; Cataract Surgery- Bilateral;                   

Cholecystectomy; Splenectomy;                                                                   

- Social history: Smoking status: Patient uses tobacco products, light tobacco smoker.            

No barriers to communication noted, The patient speaks fluent English.                          

- Family history: Not pertinent.                                                                  

- : The pt / caregiver states he / she is not on anticoagulants. Home medication list             

is obtained from the patient.                                                                   

- Exposure Risk Screening:: None identified.                                                      

                                                                                                  

                                                                                                  

Vital Signs:                                                                                      

                                                                                             

10:12  / 97; Pulse 67; Resp 16; Temp 98.7(O); Pulse Ox 98% on R/A; Weight 53.52 kg /    elp 

      117.99 lbs (R); Height 5 ft. 5 in. (165.10 cm) (R);                                         

12:23  / 81; Pulse 62; Resp 18; Temp 98.9(O); Pulse Ox 98% on R/A; Pain 0/10;           srm 

14:19  / 74; Pulse 62; Resp 16; Temp 98.3(O); Pulse Ox 98% on R/A; Pain 0/10;           ml6 

10:12 Body Mass Index 19.64 (53.52 kg, 165.10 cm)                                             elp 

                                                                                                  

MDM:                                                                                              

10:33 -Blood Culture (Adults Only), peripheral from different site, or from device/port/PICC  fg  

      etc. if present ordered.                                                                    

10:33 Obtain sample by nasal aspiration ordered.                                              fg  

10:33 IV Saline Lock ordered.                                                                 fg  

10:35 CBC with Diff Ordered.                                                                  EDMS

10:35 Basic Metabolic Profile Ordered.                                                        EDMS

10:35 -Blood Culture Ordered.                                                                 EDMS

10:35 -Influenza A&B Rapid Antigen - Nose Ordered.                                            EDMS

10:35 Chest, 2 View (pa\E\lat) Ordered.                                                         EDMS

11:03 -Blood Culture (Adults Only), peripheral from different site, or from device/port/PICC  deg 

      etc. if present complete.                                                                   

11:04 BLOOD CULTURES Ordered.                                                                 EDMS

11:40 Strep Screen, Nursing ordered.                                                          fg  

11:51 Financial registration complete.                                                        lg  

12:05 [facility] Payment Agreement was scanned into [employer] and attached to record.               lg  

12:13 azithromycin 500 mg IVPB once over 1 hrs; dilute in 250mL of D5W or NS ordered.         fg  

12:27 Lactic Acid (Gray tube on ice) Ordered.                                                 EDMS

13:24 Admission / Observation Status ordered.                                                 EDMS

13:24 CONSISTENT CARBOHYDRATES ordered.                                                       EDMS

13:24 2 GRAM SODIUM DIET ordered.                                                             EDMS

14:19 CT Chest without contrast Ordered.                                                      EDMS

14:20 RESPIRATORY PANEL Ordered.                                                              EDMS

14:20 SPUTUM CULTURE AND GRAM STAIN Ordered.                                                  EDMS

14:26 CARDIAC INJURY PROFILE Ordered.                                                         EDMS

14:26 CARDIAC INJURY PROFILE Ordered.                                                         EDMS

14:26 TROPONIN Ordered.                                                                       EDMS

14:26 TROPONIN Ordered.                                                                       EDMS

15:21 T-Sheet-- Draft Copy was scanned into [employer] and attached to record.                   gb  

                                                                                                  

Administered Medications:                                                                         

12:23 Drug: azithromycin 500 mg [azithromycin 500 mg intravenous solution] Route: IVPB;       srm 

      Infused Over: 1 hrs; Site: right forearm;                                                   

                                                                                                  

                                                                                                  

Signatures:                                                                                       

Dispatcher [employer]                           EDMS                                                 

Cheryl Zavaleta, Unit Clerk              Unit deg                                                  

Deepika Lucio RN                       RN   kpNataliia Luo, Reg                  Reg  gb                                                   

Jose Martell, Reg                    Reg  lg                                                   

Matthew Acuña RN                       RN   ml6                                                  

Iram Blackwell MD MD                                                      

Zunilda Villa RN   srm                                                  

                                                                                                  

The chart was reviewed and I authenticate all verbal orders and agree with the evaluation and 
treatment provided.Attachments:

12:05 [facility] Payment Agreement                                                                lg  

15:21 T-Sheet-- Draft Copy                                                                    gb  

                                                                                                  

**************************************************************************************************



*** Chart Complete ***
MTDD

## 2017-02-11 NOTE — EDDOCDS
Nurse's Notes                                                                                     

[facility]                                                                         

Name: Liliana Haas                                                                                 

Age: 61 yrs                                                                                       

Sex: Female                                                                                       

: 1955                                                                                   

MRN: [id number]                                                                                     

Arrival Date: 2017                                                                          

Time: 10:11                                                                                       

Account#: [id number]                                                                              

Bed 8                                                                                             

Private MD: Sherry Cook P                                                                     

Diagnosis: Acute pharyngitis;Fever, unspecified                                                   

                                                                                                  

Presentation:                                                                                     

                                                                                             

10:16 Presenting complaint: Patient states: cough congestion and fever x 1 week,is a kidney   [facility] 

      transplant pt. Dr Cook sent pt here for admission. Adult Sepsis Screening: The            

      patient does not have new or worsening altered mentation. Patient's respiratory rate is     

      less than 22. Systolic blood pressure is greater than 100. Patient has a qSOFA score of     

      0- Negative Sepsis Screen. Suicide/Homicide risk assessment- the patient denies having      

      any suicidal and/or homicidal ideations and does not present with any other emotional,      

      behavioral or mental health complaints.  Status: Patient is not a service           

      member or  dependent. Transition of care: patient was received from Nephrology      

      office.                                                                                     

10:16 Acuity: HENRY Level 3                                                                     [facility] 

10:16 Method Of Arrival: Walkin/Carried/Asstd                                                 [facility] 

                                                                                                  

Triage Assessment:                                                                                

10:30 General: Appears in no apparent distress, Behavior is appropriate for age, pleasant.    j 

      Pain: Denies pain. Pt Declines HIV testing. Neurological: Level of Consciousness is         

      awake, alert, Oriented to person, place, time. Respiratory: Airway is patent                

      Respiratory effort is even, unlabored, Respiratory pattern is regular, symmetrical,         

      Reports cough that is non-productive. Derm: Skin is pink, warm & dry.                     

                                                                                                  

Historical:                                                                                       

- Allergies: Halcion (hallucinations); Nifedipine (heart races); HYDRALAZINE (Unknown);           

- Home Meds:                                                                                      

1. tacrolimus 1 mg oral cap every 12 hours                                                      

     (Last dose: 2017 10:00)                                                                

2. Myfortic 180 mg oral TbEC twice a day                                                        

     (Last dose: 2017 10:00)                                                                

3. prednisone 5 mg Oral tab once daily                                                          

     (Last dose: 2017 10:00)                                                                

4. metoprolol tartrate 25 mg Oral tab 1 tab 2 times per day                                     

     (Last dose: 2017 10:00)                                                                

5. magnesium oxide 400 mg Oral cap 400 mg twice a day                                           

     (Last dose: 2017 10:00)                                                                

6. losartan oral 20 mg oral 1 tab once daily                                                    

     (Last dose: 2017 10:00)                                                                

7. Humalog 100 unit/mL Sub-Q crtg sliding scale TID 7 units at 0720                             

8. Lantus 100 unit/mL Sub-Q soln between 12-14 units at bedtime depending on her FSBS .         

     14 units kast PM                                                                             

- PMHx: Diabetes - IDDM: controlled; Hypertension; kidney transplant; end stage renal             

disease; ruptured pancreas;                                                                     

- PSHx: Rotator Cuff Repair- Left; Kidney Transplant- Left; Kidney Transplant- Right;             

Hip Arthroplasty, Left; Hip Arthroplasty, Right; Cataract Surgery- Bilateral;                   

Cholecystectomy; Splenectomy;                                                                   

- Social history: Smoking status: Patient uses tobacco products, light tobacco smoker.            

No barriers to communication noted, The patient speaks fluent English.                          

- Family history: Not pertinent.                                                                  

- : The pt / caregiver states he / she is not on anticoagulants. Home medication list             

is obtained from the patient.                                                                   

- Exposure Risk Screening:: None identified.                                                      

                                                                                                  

                                                                                                  

Screenin:03 Screening information is obtained from the patient. Fall risk: No risks identified.     ml6 

      Assistance ADL's: requires no assistance with activities of daily living. Abuse/DV          

      Screen: The patient / caregiver reports he/she is: not in a situation that causes fear,     

      pain or injury. Nutritional screening: No deficits noted. Advance Directives:               

      Currently, there is no health care proxy. home support is adequate.                         

                                                                                                  

Assessment:                                                                                       

11:19 General: Appears in no apparent distress, comfortable, Behavior is appropriate for age, jo3 

      cooperative, pleasant. Neurological: Level of Consciousness is awake, alert, Oriented       

      to person, place, time. Cardiovascular: No deficits noted. Respiratory: Airway is           

      patent Respiratory effort is even, unlabored. Derm: Skin is pink, warm & dry.             

11:32 General: Appears in no apparent distress, comfortable, Behavior is appropriate for age, ml6 

      cooperative. Pain: Denies pain. Neurological: No deficits noted. Level of Consciousness     

      is awake, alert, Oriented to person, place, time. Cardiovascular: No deficits noted.        

      Capillary refill < 3 seconds is brisk in bilateral fingers toes Heart tones S1 S2           

      present Edema is absent. Pulses are all present.                                            

12:23 General: Appears in no apparent distress, comfortable, Behavior is appropriate for age, ml6 

      cooperative. Pain: Denies pain. Neurological: No deficits noted. Level of Consciousness     

      is awake, alert, Oriented to person, place, time. Cardiovascular: No deficits noted.        

      Capillary refill < 3 seconds is brisk in bilateral fingers toes Heart tones S1 S2           

      present Edema is absent. Pulses are all present. Respiratory: No deficits noted. Airway     

      is patent Respiratory effort is even, unlabored, Respiratory pattern is regular,            

      symmetrical, Breath sounds are clear bilaterally.                                           

13:30 Reassessment: Patient appears in no apparent distress at this time. Patient denies pain ml6 

      at this time. Patient states feeling better. Patient states symptoms have improved.         

14:17 General: Appears in no apparent distress, comfortable, Behavior is appropriate for age, ml6 

      cooperative. Pain: Denies pain. Neurological: No deficits noted. Level of Consciousness     

      is awake, alert, Oriented to person, place, time. Cardiovascular: No deficits noted.        

      Capillary refill < 3 seconds is brisk in bilateral fingers toes. Respiratory: No            

      deficits noted. Airway is patent Respiratory effort is even, unlabored, Respiratory         

      pattern is regular, symmetrical, Breath sounds are clear. GI: No deficits noted.            

      Abdomen is flat, non- distended Bowel sounds present X 4 quads.                             

                                                                                                  

Vital Signs:                                                                                      

10:12  / 97; Pulse 67; Resp 16; Temp 98.7(O); Pulse Ox 98% on R/A; Weight 53.52 kg (R); elp 

      Height 5 ft. 5 in. (165.10 cm) (R);                                                         

12:23  / 81; Pulse 62; Resp 18; Temp 98.9(O); Pulse Ox 98% on R/A; Pain 0/10;           srm 

14:19  / 74; Pulse 62; Resp 16; Temp 98.3(O); Pulse Ox 98% on R/A; Pain 0/10;           ml6 

10:12 Body Mass Index 19.64 (53.52 kg, 165.10 cm)                                             elp 

                                                                                                  

Vitals:                                                                                           

10:12 Log In Time: 2017 at 10:09.                                                elp 

12:03 Strep Screen is obtained and tested: Positive.                                          ml6 

                                                                                                  

ED Course:                                                                                        

10:11 Patient visited by Mer Venegas PCA.                                                  elp 

10:11 Patient moved to Waiting                                                                elp 

10:12 Sherry Cook is Private Physician.                                                    elp 

10:14 Patient visited by Patchen, Mer, PCA.                                                  elp 

10:14 Patient moved to Pre RCE                                                                elp 

10:18 Triage Initiated                                                                        kpj 

10:32 Patient moved to 8                                                                      [facility] 

11:03 Iram Blackwell MD is Attending Physician.                                               fg  

11:03 Patient visited by Iram Blackwell MD.                                                   fg  

11:16 Patient visited by Matthew Acuña, RN.                                                   ml6 

11:17 BLOOD CULTURES Sent.                                                                    ml6 

11:18 Inserted saline lock: 20 gauge in right forearm. Labs drawn. (by ED staff). Sent per    jo3 

      order to lab. Labs/Blood culture drawn.                                                     

11:19 Patient visited by Mirian Medina,[facility].                                                jo3 

12:03 Patient visited by Matthew Acuña, RN.                                                   ml6 

12:03 The patient / caregiver is instructed regarding the plan of care and ED course.         ml6 

12:05 [facility] Payment Agreement was scanned into [employer] and attached to record.               lg  

12:24 Patient visited by Zunlida Villa RN.                                                srm 

12:28 Estrada Rich is Hospitalizing Provider.                                                 fg  

12:31 Chest, 2 View (pa\E\lat) Returned.                                                        EDMS

12:59 Lactic Acid (Gray tube on ice) Sent.                                                    ct3 

14:09 No procedures done that require assistance.                                             ml6 

15:21 T-Sheet-- Draft Copy was scanned into [employer] and attached to record.                   gb  

                                                                                                  

Administered Medications:                                                                         

12:23 Drug: azithromycin 500 mg [azithromycin 500 mg intravenous solution] Route: IVPB;       srm 

      Infused Over: 1 hrs; Site: right forearm;                                                   

                                                                                                  

                                                                                                  

Order Results:                                                                                    

Lab Order: CBC with Diff; SPEC'M 17 10:59                                                   

      Test: WHITE BLOOD COUNT; Value: 10.1; Range: 4.0-10.0; Abnormal: Above high normal;         

      Units: K/mm3; Status: F                                                                     

      Test: RED BLOOD COUNT; Value: 4.71; Range: 4.00-5.40; Units: M/mm3; Status: F               

      Test: HEMOGLOBIN; Value: 14.5; Range: 12.0-16.0; Units: g/dl; Status: F                     

      Test: HEMATOCRIT; Value: 45.4; Range: 36.0-47.0; Units: %; Status: F                        

      Test: MEAN CORPUSCULAR VOLUME; Value: 96.4; Range: 80.0-96.0; Abnormal: Above high          

      normal; Units: fl; Status: F                                                                

      Test: MEAN CORPUSCULAR HEMOGLOBIN; Value: 30.8; Range: 27.0-33.0; Units: pg; Status: F      

      Test: MEAN CORPUSCULAR HGB CONC; Value: 32.0; Range: 32.0-36.5; Units: g/dl; Status: F      

      Test: RED CELL DISTRIBUTION WIDTH; Value: 12.9; Range: 11.5-14.5; Units: %; Status: F       

      Test: PLATELET COUNT, AUTOMATED; Value: 242; Range: 150-450; Units: k/mm3; Status: F        

      Test: NEUTROPHILS %; Value: 56.8; Range: 36.0-66.0; Units: %; Status: F                     

      Test: LYMPH %; Value: 29.4; Range: 24.0-44.0; Units: %; Status: F                           

      Test: MONO %; Value: 7.9; Range: 0.0-5.0; Abnormal: Above high normal; Units: %;            

      Status: F                                                                                   

      Test: EOS %; Value: 1.2; Range: 0.0-3.0; Units: %; Status: F                                

      Test: BASO %; Value: 0.3; Range: 0.0-1.0; Units: %; Status: F                               

      Test: LARGE UNSTAINED CELL %; Value: 4.4; Range: 0.0-4.0; Abnormal: Above high normal;      

      Units: %; Status: F                                                                         

      Test: NEUTROPHILS #; Value: 5.7; Range: 1.8-7.7; Units: K/mm3; Status: F                    

      Test: LYMPH #; Value: 3.0; Range: 1.5-4.5; Units: K/mm3; Status: F                          

      Test: MONO #; Value: 0.8; Range: 0.0-0.8; Units: K/mm3; Status: F                           

      Test: EOS #; Value: 0.1; Range: 0.0-0.50; Units: K/mm3; Status: F                           

      Test: BASO #; Value: 0.0; Range: 0.0-0.2; Units: K/mm3; Status: F                           

      Test: LARGE UNSTAINED CELL #; Value: 0.4; Range: 0.0-0.4; Units: K/mm3; Status: F           

Lab Order: Basic Metabolic Profile; SPEC'M 17 10:59                                         

      Test: GLUCOSE, FASTING; Value: 226; Range: ; Abnormal: Above high normal; Units:      

      MG/DL; Status: F                                                                            

      Test: BLOOD UREA NITROGEN; Value: 23; Range: 7-18; Abnormal: Above high normal; Units:      

      MG/DL; Status: F                                                                            

      Test: CREATININE FOR GFR; Value: 1.17; Range: 0.55-1.02; Abnormal: Above high normal;       

      Units: MG/DL; Status: F                                                                     

      Test: GLOMERULAR FILTRATION RATE; Value: 50.4; Range: >51; Abnormal: Below low normal;      

      Status: F                                                                                   

      Test: SODIUM LEVEL; Value: 140; Range: 136-145; Units: MEQ/L; Status: F                     

      Test: POTASSIUM SERUM; Value: 4.7; Range: 3.5-5.1; Units: MEQ/L; Status: F                  

      Test: CHLORIDE LEVEL; Value: 107; Range: ; Units: MEQ/L; Status: F                    

      Test: CARBON DIOXIDE LEVEL; Value: 23; Range: 21-32; Units: MEQ/L; Status: F                

      Test: ANION GAP; Value: 10; Range: 8-16; Units: MEQ/L; Status: F                            

      Test: CALCIUM LEVEL; Value: 9.0; Range: 8.5-10.1; Units: MG/DL; Status: F                   

      Test Note: &nbsp;; Units are mL/min/1.73 m2 Chronic Kidney Disease Staging per NKF:       

      Stage I & II GFR >=60 Normal to Mildly Decreased Stage III GFR 30-59 Moderately           

      Decreased Stage IV GFR 15-29 Severely Decreased Stage V GFR <15 Very Little GFR Left        

      ESRD GFR <15 on RRT                                                                         

Lab Order: -Influenza A&B Rapid Antigen - Nose; SPEC'M 17 10:59                           

      Test: INFLUENZA A RAPID SCR by ICA; Value: INFLUENZA A RESULTS NEGATIVE; Status: F          

      Test: INFLUENZA A RAPID SCR by ICA; Value: Comments:; Status: F                             

      Test: INFLUENZA B RAPID SCR by ICA; Value: INFLUENZA B RESULTS NEGATIVE; Status: F          

      Test Note: &nbsp;; The Influenza test is a direct rapid immunoassay for the qualitative   

      detection of Influenza viral antigen. Cell culture (Viral Culture) testing should be        

      considered to confirm NEGATIVE results and to assist in detecting other viruses that        

      can provide similar clinical symptoms. Please contact the lab within 24 hours               

      ([phone] if confirmatory testing is desired.                                              

Lab Order: Lactic Acid (Gray tube on ice); SPEC'M 17 12:53                                  

      Test: LACTIC ACID SEPSIS PROTOCOL; Value: 0.7; Range: 0.4-2.0; Units: MMOL/L; Status: F     

                                                                                                  

Radiology Order: Chest, 2 View (pa\E\lat)                                                         

      Test: Chest, 2 View (pa\E\lat)                                                              

      REASON FOR EXAMINATION: Chest Pain;Cough; Chest x-ray: Two views.; ; History: Chest         

      pain and cough.; ; Comparison chest x-ray 2012. A chest CT is reviewed         

      from 2013.; ; Findings: There is a large pleural-based mass projecting       

      in the left; posterolateral chest wall superiorly unchanged from the 2012 prior study.      

      There; is peripherally calcified on chest CT from . There is also calcific pleural;     

      plaquing along the left lateral chest wall which is unchanged from the 2012 study; as       

      well. Left hemidiaphragm is slightly elevated as before with blunting of the; lateral       

      pleural angle. These changes are chronic. There are surgical clips; projecting in the       

      right breast soft tissues as before. The lungs are otherwise; clear. Pleural angles are     

      sharp posteriorly. Heart is not enlarged. The aorta; is slightly tortuous.; ;               

      Impression:; ; No active cardiopulmonary disease. Chronic calcific pleural plaquing and     

      chronic; calcific pleural mass lesion seen on the left.; ; ; Signed by; Rajendra Oneal MD     

      2017 12:25 P;                                                                         

Outcome:                                                                                          

12:33 Decision to Hospitalize by Provider.                                                      

14:08 Discharge Assessment: patient administered narcotics - no. The following High Risk      ml6 

      Discharge criteria are identified: None. No special radiology studies were completed.       

      Admission hand-off: Report Faxed Fax receipt verified by [employer] [facility]. Property :Personal       

      belongings accompany Pt.                                                                    

14:09 Condition: stable.                                                                      ml6 

14:31 Patient left the ED.                                                                    ml6 

                                                                                                  

Signatures:                                                                                       

Dispatcher [employer]                           EDDeepika Balbuena RN                       Zunilda Millan RN RN   srm                                                  

Barpaolo, Nataliia, Reg                  Reg  gb                                                   

Jose Martell, Reg                    Reg  lg                                                   

Mirian Medina RN RN jo3 Lowe, Matthew, RN RN   ml6                                                  

Blaire Olivo, PCA                  PCA  ct3                                                  

Mer Venegas, PCA                      PCA  elp                                                  

Iram Blackwell MD MD   fg                                                   

                                                                                                  

Corrections: (The following items were deleted from the chart)                                    

: 12: General: Appears in no apparent distress, comfortable, Behavior is appropriate    ml6 

      for age, cooperative, srm                                                                   

: 12:23 Pain: Denies pain. srm                                                            ml6 

12: 12:23 Neurological: No deficits noted. Level of Consciousness is awake, alert, Oriented ml6 

      to person, place, time, srm                                                                 

: 12:23 Cardiovascular: No deficits noted. Capillary refill < 3 seconds is brisk in       ml6 

      bilateral fingers toes Heart tones S1 S2 present Edema is absent. Pulses are all            

      present. srm                                                                                

: 12:23 Respiratory: No deficits noted. Airway is patent Respiratory effort is even,      ml6 

      unlabored, Respiratory pattern is regular, symmetrical, Breath sounds are clear             

      bilaterally. srm                                                                            

                                                                                                  

**************************************************************************************************



*** Chart Complete ***
MTDD

## 2017-02-12 NOTE — DSES
DATE OF ADMISSION:  02/09/2017

DATE OF DISCHARGE: 02/10/2017

 

PRIMARY CARE PROVIDER: Dr. Cook

 

DISCHARGE DIAGNOSES:

Acute bronchitis with positive strep throat.

Acute kidney injury, resolved.

Diabetes.

Hypertension.

Hyperlipidemia.

Chronic atrial fibrillation.

History of ANCA-positive vasculitis causing renal failure.

Status post renal transplant times two in 1992 and 2014.

Chronic pleural masses with calcified cysts in between.

 

DISCHARGE MEDICATIONS:

- Augmentin 875 mg by mouth twice a day

- albuterol 2.5 mg inhalation in nebulizer solution three times a day as needed

- Tylenol 650 mg every 4 hours as needed

- Lantus 14 units at bedtime

- Lispro before meals sliding scale

- lactobacillus one tablet daily

- losartan 25 mg daily

- magnesium oxide 400 mg twice a day

- metoprolol tartrate 25 mg by mouth twice a day

- Myfortic 540 mg by mouth twice a day

- prednisone 5 mg by mouth daily

- PreserVision AREDS one capsule by mouth daily

- simvastatin 20 mg at bedtime

- tacrolimus 1 mg by mouth twice a day

 

HOSPITAL COURSE: This is a 61-year-old female with a history of end-stage renal

disease due to ANCA vasculitis, status post two renal transplants, the first one

was in 1992 and the second one in 2014, diabetes, hypertension, hyperlipidemia,

with history of recurrent Clostridium difficile (C diff) 2-3 years back,

presented to the hospital, sent from primary medical doctor's (PMD's) office for

fever and cough for about 1 week with a maximum temperature (T max) of 101. She

had sick contacts with her granddaughter who had influenza. The patient tested

positive for strep throat in the emergency department (ED). Influenza was

negative. The patient was started on Augmentin, received nebulizers with

improvement of her symptoms. Today, the patient's symptoms have resolved, the

patient was functioning at her baseline with stable vital signs, who is going to

be discharged home in stable condition.

 

PHYSICAL EXAMINATION:

Vital signs: Temperature 98.4, pulse 93, respiratory rate 18, blood pressure

140/78, pulse oximetry 95% in room air.

General: Patient awake, alert, oriented times three, sitting up in bed in no

acute distress.

HEENT: Normocephalic, atraumatic. Moist mucous membranes. Anicteric eyes.

Chest: There is mild wheezing, otherwise clear to auscultation.

Cardiovascular: S1, S2 regular. No rub, murmur or gallop.

Abdomen: Soft, nontender. Bowel sounds present.

Extremities: No edema.

 

LABORATORY DATA: WBC 7.4, hemoglobin 12.6, platelets 263. Sodium 144, potassium

5, chloride 115, bicarbonate 19, anion gap 10, BUN 16, creatinine 0.9, glucose

167. Cardiac enzymes are negative. Calcium 8.6. Magnesium 2.1. Tacrolimus level

is pending.

 

CT chest showed stable, calcified pleural masses on the left with low density

centers. This could be calcified pleural cysts or pleural masses with cystic

degeneration. Atelectasis at the lung bases or scarring at the lung bases. There

is a 4 mm pleural based nodule posteriorly in the right upper lobe. Suggested

followup CT in 12 months. Calcifications at the onofre hepatis, cholecystectomy,

unchanged from previous study.

 

DISPOSITION: The patient is discharged home in stable condition.

 

DISCHARGE INSTRUCTIONS: Patient to followup with primary care provider in 1 week,

regular diet, activity as tolerated.

## 2017-03-24 ENCOUNTER — HOSPITAL ENCOUNTER (OUTPATIENT)
Dept: HOSPITAL 53 - M LAB REF | Age: 62
End: 2017-03-24
Attending: INTERNAL MEDICINE
Payer: MEDICARE

## 2017-03-24 DIAGNOSIS — Z94.0: Primary | ICD-10-CM

## 2017-04-21 ENCOUNTER — HOSPITAL ENCOUNTER (OUTPATIENT)
Dept: HOSPITAL 53 - M SDC | Age: 62
Discharge: HOME | End: 2017-04-21
Attending: OTOLARYNGOLOGY
Payer: MEDICARE

## 2017-04-21 VITALS — SYSTOLIC BLOOD PRESSURE: 157 MMHG | DIASTOLIC BLOOD PRESSURE: 75 MMHG

## 2017-04-21 VITALS — WEIGHT: 123 LBS | HEIGHT: 65 IN | BODY MASS INDEX: 20.49 KG/M2

## 2017-04-21 DIAGNOSIS — Z79.899: ICD-10-CM

## 2017-04-21 DIAGNOSIS — E78.00: ICD-10-CM

## 2017-04-21 DIAGNOSIS — F17.210: ICD-10-CM

## 2017-04-21 DIAGNOSIS — E11.9: ICD-10-CM

## 2017-04-21 DIAGNOSIS — Z88.8: ICD-10-CM

## 2017-04-21 DIAGNOSIS — Z96.1: ICD-10-CM

## 2017-04-21 DIAGNOSIS — Z78.0: ICD-10-CM

## 2017-04-21 DIAGNOSIS — Z96.643: ICD-10-CM

## 2017-04-21 DIAGNOSIS — Z94.0: ICD-10-CM

## 2017-04-21 DIAGNOSIS — Z87.448: ICD-10-CM

## 2017-04-21 DIAGNOSIS — N18.6: ICD-10-CM

## 2017-04-21 DIAGNOSIS — Z79.4: ICD-10-CM

## 2017-04-21 DIAGNOSIS — I12.0: Primary | ICD-10-CM

## 2017-04-21 PROCEDURE — 30130 EXCISE INFERIOR TURBINATE: CPT

## 2017-04-21 PROCEDURE — 30520 REPAIR OF NASAL SEPTUM: CPT

## 2017-04-24 NOTE — RO
DATE OF PROCEDURE:  04/21/2017

 

PREPROCEDURE DIAGNOSIS: Nasal septal deviation, chronic rhinitis, nasal valve

collapse.

 

POSTPROCEDURE DIAGNOSIS:  Nasal septal deviation, chronic rhinitis, nasal valve

collapse.

 

PROCEDURE:  Left turbinectomy, septoplasty and nasal valve repair.

 

SURGEON:  Dr. Raffaele Ritter

 

ASSISTANT:

 

ANESTHESIA:

 

ESTIMATED BLOOD LOSS:

 

DESCRIPTION OF OPERATION:  Under general anesthesia with the patient intubated,

the patient was draped in the usual manner. I infiltrated with the lidocaine with

epinephrine, then used pledgets of adrenaline 1:100,000. I made an incision on

the septum on the left side and elevated subperichondrial plane.

I removed a small amount of cartilage in the inferior anterior aspect of the

septum which was slightly deviated toward that left side.  I closed that wound

with #4-0 Vicryl, #4-0 Chromic suture.  I then made an incision anterior to the

inferior turbinate on the left side and I drilled two holes in the nasal bone

inferior aspect.  I then used #4-0 Vicryl and put sutures through the holes and

then through the base of the lower lateral cartilage on that left side and tied

that to suspend the lower lateral aspect of the lateral john of the lower lateral

cartilage on that side and then used the microdebrider to trim the inferior

turbinate on that side.  Patient tolerated the procedure well. Minimal blood

loss.  Patient was extubated and transferred to the recovery room in excellent

condition.

## 2017-06-28 ENCOUNTER — HOSPITAL ENCOUNTER (OUTPATIENT)
Dept: HOSPITAL 53 - M LAB REF | Age: 62
End: 2017-06-28
Attending: INTERNAL MEDICINE
Payer: MEDICARE

## 2017-06-28 DIAGNOSIS — Z94.0: Primary | ICD-10-CM

## 2017-09-19 ENCOUNTER — HOSPITAL ENCOUNTER (OUTPATIENT)
Dept: HOSPITAL 53 - M SMT | Age: 62
End: 2017-09-19
Attending: INTERNAL MEDICINE
Payer: MEDICARE

## 2017-09-19 DIAGNOSIS — J18.9: Primary | ICD-10-CM

## 2017-09-19 DIAGNOSIS — Z94.0: ICD-10-CM

## 2017-09-20 NOTE — REP
PA and lateral chest:

 

Comparisons are the PA and lateral chest studies of 02/09/2017 and 05/26/2012 and

chest CT of 02/09/2017.

 

There are large pleural-based masses on the left, unchanged.  There is mild

effacement of the left costophrenic angle, unchanged.

 

There are surgical clips in the right breast, unchanged.

 

Lung fields otherwise clear.  Cardiac size is normal.  The robbin, mediastinum, and

bony thorax are unremarkable.

 

Impression:

 

There are chronic stable findings as described, unchanged from 02/09/2017 and

also unchanged for a PA and lateral chest of 05/26/2012.

 

 

Signed by

Dorian Victor MD 09/19/2017 04:31 P

## 2017-10-24 ENCOUNTER — HOSPITAL ENCOUNTER (OUTPATIENT)
Dept: HOSPITAL 53 - M LAB REF | Age: 62
End: 2017-10-24
Attending: INTERNAL MEDICINE
Payer: MEDICARE

## 2017-10-24 DIAGNOSIS — E78.00: Primary | ICD-10-CM

## 2017-10-24 DIAGNOSIS — Z94.0: ICD-10-CM

## 2017-10-24 DIAGNOSIS — Z48.22: ICD-10-CM

## 2017-10-24 LAB
CHOLEST SERPL-MCNC: 179 MG/DL (ref ?–200)
TRIGL SERPL-MCNC: 63 MG/DL (ref ?–150)

## 2017-11-28 ENCOUNTER — HOSPITAL ENCOUNTER (OUTPATIENT)
Dept: HOSPITAL 53 - M LAB REF | Age: 62
End: 2017-11-28
Attending: INTERNAL MEDICINE
Payer: MEDICARE

## 2017-11-28 DIAGNOSIS — Z48.22: ICD-10-CM

## 2017-11-28 DIAGNOSIS — N39.0: Primary | ICD-10-CM

## 2018-01-02 ENCOUNTER — HOSPITAL ENCOUNTER (OUTPATIENT)
Dept: HOSPITAL 53 - M LAB REF | Age: 63
End: 2018-01-02
Attending: INTERNAL MEDICINE
Payer: MEDICARE

## 2018-01-02 DIAGNOSIS — Z94.0: Primary | ICD-10-CM

## 2018-01-02 PROCEDURE — 80197 ASSAY OF TACROLIMUS: CPT

## 2018-01-05 LAB — FK 506 (TACROLIMUS) LABCORP: 11.7 NG/ML (ref 2–20)

## 2018-01-23 ENCOUNTER — HOSPITAL ENCOUNTER (OUTPATIENT)
Dept: HOSPITAL 53 - M SDC | Age: 63
Discharge: HOME | End: 2018-01-23
Attending: OTOLARYNGOLOGY
Payer: MEDICARE

## 2018-01-23 DIAGNOSIS — Z94.0: ICD-10-CM

## 2018-01-23 DIAGNOSIS — Z88.8: ICD-10-CM

## 2018-01-23 DIAGNOSIS — Z79.899: ICD-10-CM

## 2018-01-23 DIAGNOSIS — Z96.1: ICD-10-CM

## 2018-01-23 DIAGNOSIS — Z79.4: ICD-10-CM

## 2018-01-23 DIAGNOSIS — Z72.0: ICD-10-CM

## 2018-01-23 DIAGNOSIS — Z87.19: ICD-10-CM

## 2018-01-23 DIAGNOSIS — E78.00: ICD-10-CM

## 2018-01-23 DIAGNOSIS — M12.9: ICD-10-CM

## 2018-01-23 DIAGNOSIS — E10.9: ICD-10-CM

## 2018-01-23 DIAGNOSIS — I10: ICD-10-CM

## 2018-01-23 DIAGNOSIS — Z78.0: ICD-10-CM

## 2018-01-23 DIAGNOSIS — J31.0: ICD-10-CM

## 2018-01-23 DIAGNOSIS — K21.9: ICD-10-CM

## 2018-01-23 DIAGNOSIS — M95.0: Primary | ICD-10-CM

## 2018-01-23 DIAGNOSIS — Z87.440: ICD-10-CM

## 2018-01-23 DIAGNOSIS — Z96.643: ICD-10-CM

## 2018-01-23 LAB
GLUCOSE BLDC GLUCOMTR-MCNC: 104 MG/DL (ref 80–115)
GLUCOSE BLDC GLUCOMTR-MCNC: 78 MG/DL (ref 80–115)

## 2018-01-23 PROCEDURE — 30410 RECONSTRUCTION OF NOSE: CPT

## 2018-01-23 RX ADMIN — LIDOCAINE HYDROCHLORIDE,EPINEPHRINE BITARTRATE 1 ML: 10; .01 INJECTION, SOLUTION INFILTRATION; PERINEURAL at 12:35

## 2018-01-23 RX ADMIN — SODIUM CHLORIDE, POTASSIUM CHLORIDE, SODIUM LACTATE AND CALCIUM CHLORIDE 1 MLS/HR: 600; 310; 30; 20 INJECTION, SOLUTION INTRAVENOUS at 12:34

## 2018-01-23 RX ADMIN — METHYLENE BLUE 1 ML: 5 INJECTION INTRAVENOUS at 12:17

## 2018-01-23 RX ADMIN — DEXTROSE MONOHYDRATE 1 MG: 50 INJECTION, SOLUTION INTRAVENOUS at 12:17

## 2018-02-16 ENCOUNTER — HOSPITAL ENCOUNTER (OUTPATIENT)
Dept: HOSPITAL 53 - M LAB REF | Age: 63
End: 2018-02-16
Attending: INTERNAL MEDICINE
Payer: MEDICARE

## 2018-02-16 DIAGNOSIS — Z94.0: Primary | ICD-10-CM

## 2018-02-16 PROCEDURE — 80197 ASSAY OF TACROLIMUS: CPT

## 2018-02-19 LAB — FK 506 (TACROLIMUS) LABCORP: 5.1 NG/ML (ref 2–20)

## 2018-08-13 ENCOUNTER — HOSPITAL ENCOUNTER (OUTPATIENT)
Dept: HOSPITAL 53 - M LAB REF | Age: 63
End: 2018-08-13
Attending: INTERNAL MEDICINE
Payer: MEDICARE

## 2018-08-13 DIAGNOSIS — Z94.0: Primary | ICD-10-CM

## 2018-08-13 PROCEDURE — 80197 ASSAY OF TACROLIMUS: CPT

## 2018-08-17 LAB — FK 506 (TACROLIMUS) LABCORP: 7.7 NG/ML (ref 2–20)

## 2018-09-08 ENCOUNTER — HOSPITAL ENCOUNTER (INPATIENT)
Dept: HOSPITAL 53 - M ED | Age: 63
LOS: 3 days | Discharge: HOME | DRG: 872 | End: 2018-09-11
Attending: HOSPITALIST | Admitting: HOSPITALIST
Payer: MEDICARE

## 2018-09-08 DIAGNOSIS — D63.1: ICD-10-CM

## 2018-09-08 DIAGNOSIS — E87.4: ICD-10-CM

## 2018-09-08 DIAGNOSIS — Z98.41: ICD-10-CM

## 2018-09-08 DIAGNOSIS — Z96.643: ICD-10-CM

## 2018-09-08 DIAGNOSIS — Z79.4: ICD-10-CM

## 2018-09-08 DIAGNOSIS — I10: ICD-10-CM

## 2018-09-08 DIAGNOSIS — A41.9: Primary | ICD-10-CM

## 2018-09-08 DIAGNOSIS — Z88.8: ICD-10-CM

## 2018-09-08 DIAGNOSIS — E11.9: ICD-10-CM

## 2018-09-08 DIAGNOSIS — Z90.49: ICD-10-CM

## 2018-09-08 DIAGNOSIS — Z90.81: ICD-10-CM

## 2018-09-08 DIAGNOSIS — F17.210: ICD-10-CM

## 2018-09-08 DIAGNOSIS — E78.5: ICD-10-CM

## 2018-09-08 DIAGNOSIS — N17.9: ICD-10-CM

## 2018-09-08 DIAGNOSIS — Z79.52: ICD-10-CM

## 2018-09-08 DIAGNOSIS — Z94.0: ICD-10-CM

## 2018-09-08 DIAGNOSIS — A04.0: ICD-10-CM

## 2018-09-08 DIAGNOSIS — D84.9: ICD-10-CM

## 2018-09-08 DIAGNOSIS — Z79.899: ICD-10-CM

## 2018-09-08 DIAGNOSIS — A04.71: ICD-10-CM

## 2018-09-08 DIAGNOSIS — E83.42: ICD-10-CM

## 2018-09-08 DIAGNOSIS — Z98.42: ICD-10-CM

## 2018-09-08 LAB
ABG BASE EXCESS: -11.1 (ref -2–2)
ABG HCO3: 11.9 MEQ/L (ref 22–26)
ABG O2 SATURATION: 98.5 % (ref 95–99)
ABG PARTIAL PRESSURE CO2: 20.4 MMHG (ref 35–45)
ABG PARTIAL PRESSURE O2: 128.1 MMHG (ref 75–100)
ABG PH (ARTERIAL): 7.38 UNITS (ref 7.35–7.45)
ABG STANDARD HCO3: 15.7 MEQ/L (ref 22–26)
ABG TOTAL CO2: 12.6 MEQ/L (ref 23–31)
ALBUMIN/GLOBULIN RATIO: 0.8 (ref 1–1.93)
ALBUMIN: 2.8 GM/DL (ref 3.2–5.2)
ALBUMIN: 3.3 GM/DL (ref 3.2–5.2)
ALKALINE PHOSPHATASE: 87 U/L (ref 45–117)
ALT SERPL W P-5'-P-CCNC: 20 U/L (ref 12–78)
AMYLASE SERPL-CCNC: 18 U/L (ref 25–115)
ANION GAP: 12 MEQ/L (ref 8–16)
ANION GAP: 12 MEQ/L (ref 8–16)
AST SERPL-CCNC: 14 U/L (ref 7–37)
BASO #: 0.1 10^3/UL (ref 0–0.2)
BASO %: 0.3 % (ref 0–1)
BILIRUB CONJ SERPL-MCNC: < 0.1 MG/DL (ref 0–0.2)
BILIRUBIN,TOTAL: 0.4 MG/DL (ref 0.2–1)
BLOOD UREA NITROGEN: 25 MG/DL (ref 7–18)
BLOOD UREA NITROGEN: 27 MG/DL (ref 7–18)
CALCIUM LEVEL: 8 MG/DL (ref 8.8–10.2)
CALCIUM LEVEL: 9.3 MG/DL (ref 8.8–10.2)
CARBON DIOXIDE LEVEL: 12 MEQ/L (ref 21–32)
CARBON DIOXIDE LEVEL: 14 MEQ/L (ref 21–32)
CHLORIDE LEVEL: 113 MEQ/L (ref 98–107)
CHLORIDE LEVEL: 116 MEQ/L (ref 98–107)
CK MB CFR.DF SERPL CALC: 2.32
CK SERPL-CCNC: 43 U/L (ref 26–192)
CK-MB VALUE MASS: < 1 NG/ML (ref ?–3.6)
CREATININE FOR GFR: 1.29 MG/DL (ref 0.55–1.3)
CREATININE FOR GFR: 1.37 MG/DL (ref 0.55–1.3)
EOS #: 0.2 10^3/UL (ref 0–0.5)
EOSINOPHIL NFR BLD AUTO: 1.3 % (ref 0–3)
GFR SERPL CREATININE-BSD FRML MDRD: 41.9 ML/MIN/{1.73_M2} (ref 45–?)
GFR SERPL CREATININE-BSD FRML MDRD: 44.9 ML/MIN/{1.73_M2} (ref 45–?)
GLUCOSE, FASTING: 101 MG/DL (ref 70–100)
GLUCOSE, FASTING: 125 MG/DL (ref 70–100)
HEMATOCRIT: 36.6 % (ref 36–47)
HEMOGLOBIN: 11.8 G/DL (ref 12–15.5)
IMMATURE GRANULOCYTE %: 0.7 % (ref 0–3)
INR: 0.98
LACTIC ACID SEPSIS PROTOCOL: 0.8 MMOL/L (ref 0.4–2)
LACTIC ACID SEPSIS PROTOCOL: 2.8 MMOL/L (ref 0.4–2)
LIPASE: 75 U/L (ref 73–393)
LYMPH #: 3.2 10^3/UL (ref 1.5–4.5)
LYMPH %: 16.9 % (ref 24–44)
MEAN CORPUSCULAR HEMOGLOBIN: 30.7 PG (ref 27–33)
MEAN CORPUSCULAR HGB CONC: 32.2 G/DL (ref 32–36.5)
MEAN CORPUSCULAR VOLUME: 95.3 FL (ref 80–96)
MONO #: 0.6 10^3/UL (ref 0–0.8)
MONO %: 3.4 % (ref 0–5)
NEUTROPHILS #: 14.5 10^3/UL (ref 1.8–7.7)
NEUTROPHILS %: 77.4 % (ref 36–66)
NRBC BLD AUTO-RTO: 0 % (ref 0–0)
PARTIAL THROMBOPLASTIN TIME: 27.8 SECONDS (ref 25.4–37.6)
PHOSPHORUS LEVEL: 2.5 MG/DL (ref 2.5–4.9)
PLATELET COUNT, AUTOMATED: 253 10^3/UL (ref 150–450)
POTASSIUM SERUM: 3.7 MEQ/L (ref 3.5–5.1)
POTASSIUM SERUM: 4 MEQ/L (ref 3.5–5.1)
PROTHROMBIN TIME: 13.1 SECONDS (ref 12.1–14.4)
RED BLOOD COUNT: 3.84 10^6/UL (ref 4–5.4)
RED CELL DISTRIBUTION WIDTH: 13.8 % (ref 11.5–14.5)
SODIUM LEVEL: 139 MEQ/L (ref 136–145)
SODIUM LEVEL: 140 MEQ/L (ref 136–145)
TOTAL PROTEIN: 7.4 GM/DL (ref 6.4–8.2)
TROPONIN I: < 0.02 NG/ML (ref ?–0.1)
WHITE BLOOD COUNT: 18.7 10^3/UL (ref 4–10)

## 2018-09-08 RX ADMIN — SODIUM CHLORIDE 1 MLS/HR: 9 INJECTION, SOLUTION INTRAVENOUS at 15:50

## 2018-09-08 RX ADMIN — ACETAMINOPHEN 1 MG: 325 TABLET ORAL at 16:50

## 2018-09-08 RX ADMIN — SODIUM CHLORIDE 1 MLS/HR: 9 INJECTION, SOLUTION INTRAVENOUS at 22:56

## 2018-09-08 RX ADMIN — ONDANSETRON 1 MG: 2 INJECTION INTRAMUSCULAR; INTRAVENOUS at 16:22

## 2018-09-08 RX ADMIN — SODIUM CHLORIDE 1 MLS/HR: 9 INJECTION, SOLUTION INTRAVENOUS at 18:24

## 2018-09-08 RX ADMIN — FIDAXOMICIN 1 MG: 200 TABLET, FILM COATED ORAL at 16:50

## 2018-09-08 RX ADMIN — METRONIDAZOLE 1 MLS/HR: 500 INJECTION, SOLUTION INTRAVENOUS at 16:22

## 2018-09-08 RX ADMIN — INSULIN LISPRO 1 UNITS: 100 INJECTION, SOLUTION INTRAVENOUS; SUBCUTANEOUS at 18:45

## 2018-09-08 RX ADMIN — ACETAMINOPHEN 1 MG: 325 TABLET ORAL at 22:00

## 2018-09-09 LAB
ALBUMIN/GLOBULIN RATIO: 0.82 (ref 1–1.93)
ALBUMIN: 2.7 GM/DL (ref 3.2–5.2)
ALKALINE PHOSPHATASE: 72 U/L (ref 45–117)
ALT SERPL W P-5'-P-CCNC: 28 U/L (ref 12–78)
ANION GAP: 10 MEQ/L (ref 8–16)
ANION GAP: 12 MEQ/L (ref 8–16)
AST SERPL-CCNC: 26 U/L (ref 7–37)
BILIRUBIN,TOTAL: 0.5 MG/DL (ref 0.2–1)
BLOOD UREA NITROGEN: 25 MG/DL (ref 7–18)
BLOOD UREA NITROGEN: 26 MG/DL (ref 7–18)
CALCIUM LEVEL: 8 MG/DL (ref 8.8–10.2)
CALCIUM LEVEL: 8.4 MG/DL (ref 8.8–10.2)
CARBON DIOXIDE LEVEL: 11 MEQ/L (ref 21–32)
CARBON DIOXIDE LEVEL: 14 MEQ/L (ref 21–32)
CHLORIDE LEVEL: 115 MEQ/L (ref 98–107)
CHLORIDE LEVEL: 116 MEQ/L (ref 98–107)
CREATININE FOR GFR: 1.28 MG/DL (ref 0.55–1.3)
CREATININE FOR GFR: 1.3 MG/DL (ref 0.55–1.3)
GFR SERPL CREATININE-BSD FRML MDRD: 44.5 ML/MIN/{1.73_M2} (ref 45–?)
GFR SERPL CREATININE-BSD FRML MDRD: 45.3 ML/MIN/{1.73_M2} (ref 45–?)
GLUCOSE BLDC GLUCOMTR-MCNC: 136 MG/DL (ref 80–115)
GLUCOSE BLDC GLUCOMTR-MCNC: 162 MG/DL (ref 80–115)
GLUCOSE BLDC GLUCOMTR-MCNC: 99 MG/DL (ref 80–115)
GLUCOSE, FASTING: 189 MG/DL (ref 70–100)
GLUCOSE, FASTING: 91 MG/DL (ref 70–100)
HEMATOCRIT: 32.1 % (ref 36–47)
HEMOGLOBIN: 10.2 G/DL (ref 12–15.5)
MAGNESIUM LEVEL: 1.7 MG/DL (ref 1.8–2.4)
MEAN CORPUSCULAR HEMOGLOBIN: 30.6 PG (ref 27–33)
MEAN CORPUSCULAR HGB CONC: 31.8 G/DL (ref 32–36.5)
MEAN CORPUSCULAR VOLUME: 96.4 FL (ref 80–96)
NRBC BLD AUTO-RTO: 0 % (ref 0–0)
PLATELET COUNT, AUTOMATED: 218 10^3/UL (ref 150–450)
POTASSIUM SERUM: 3.7 MEQ/L (ref 3.5–5.1)
POTASSIUM SERUM: 4.4 MEQ/L (ref 3.5–5.1)
RED BLOOD COUNT: 3.33 10^6/UL (ref 4–5.4)
RED CELL DISTRIBUTION WIDTH: 14 % (ref 11.5–14.5)
SODIUM LEVEL: 139 MEQ/L (ref 136–145)
SODIUM LEVEL: 139 MEQ/L (ref 136–145)
TOTAL PROTEIN: 6 GM/DL (ref 6.4–8.2)
WHITE BLOOD COUNT: 28.2 10^3/UL (ref 4–10)

## 2018-09-09 RX ADMIN — ACETAMINOPHEN 1 MG: 325 TABLET ORAL at 12:24

## 2018-09-09 RX ADMIN — MAGNESIUM OXIDE 1 MG: 400 TABLET ORAL at 09:55

## 2018-09-09 RX ADMIN — TACROLIMUS 1 MG: 1 CAPSULE ORAL at 21:43

## 2018-09-09 RX ADMIN — TACROLIMUS 1 MG: 1 CAPSULE ORAL at 09:54

## 2018-09-09 RX ADMIN — INSULIN LISPRO 4 UNITS: 100 INJECTION, SOLUTION INTRAVENOUS; SUBCUTANEOUS at 17:30

## 2018-09-09 RX ADMIN — INSULIN LISPRO 1 UNITS: 100 INJECTION, SOLUTION INTRAVENOUS; SUBCUTANEOUS at 07:30

## 2018-09-09 RX ADMIN — SODIUM BICARBONATE 1 MG: 325 TABLET ORAL at 12:24

## 2018-09-09 RX ADMIN — WATER 1 MLS/HR: 100 INJECTION, SOLUTION INTRAVENOUS at 06:35

## 2018-09-09 RX ADMIN — INSULIN LISPRO 1 UNITS: 100 INJECTION, SOLUTION INTRAVENOUS; SUBCUTANEOUS at 20:25

## 2018-09-09 RX ADMIN — SIMVASTATIN 1 MG: 20 TABLET, FILM COATED ORAL at 00:10

## 2018-09-09 RX ADMIN — INSULIN LISPRO 2 UNITS: 100 INJECTION, SOLUTION INTRAVENOUS; SUBCUTANEOUS at 17:30

## 2018-09-09 RX ADMIN — SODIUM BICARBONATE 1 MG: 325 TABLET ORAL at 17:50

## 2018-09-09 RX ADMIN — PROBIOTIC PRODUCT - TAB 1 EA: TAB at 08:21

## 2018-09-09 RX ADMIN — ACETAMINOPHEN 1 MG: 325 TABLET ORAL at 04:28

## 2018-09-09 RX ADMIN — METRONIDAZOLE 1 MLS/HR: 500 INJECTION, SOLUTION INTRAVENOUS at 16:15

## 2018-09-09 RX ADMIN — LOSARTAN POTASSIUM 1 MG: 50 TABLET, FILM COATED ORAL at 08:48

## 2018-09-09 RX ADMIN — FIDAXOMICIN 1 MG: 200 TABLET, FILM COATED ORAL at 21:43

## 2018-09-09 RX ADMIN — SODIUM CHLORIDE 1 UNITS: 4.5 INJECTION, SOLUTION INTRAVENOUS at 08:45

## 2018-09-09 RX ADMIN — WATER 1 MLS/HR: 100 INJECTION, SOLUTION INTRAVENOUS at 18:43

## 2018-09-09 RX ADMIN — SODIUM CHLORIDE 1 UNITS: 4.5 INJECTION, SOLUTION INTRAVENOUS at 21:43

## 2018-09-09 RX ADMIN — INSULIN DETEMIR 1 UNITS: 100 INJECTION, SOLUTION SUBCUTANEOUS at 00:28

## 2018-09-09 RX ADMIN — INSULIN LISPRO 2 UNITS: 100 INJECTION, SOLUTION INTRAVENOUS; SUBCUTANEOUS at 12:00

## 2018-09-09 RX ADMIN — METRONIDAZOLE 1 MLS/HR: 500 INJECTION, SOLUTION INTRAVENOUS at 08:23

## 2018-09-09 RX ADMIN — PROBIOTIC PRODUCT - TAB 1 EA: TAB at 21:41

## 2018-09-09 RX ADMIN — SODIUM CHLORIDE 1 UNITS: 4.5 INJECTION, SOLUTION INTRAVENOUS at 00:13

## 2018-09-09 RX ADMIN — METOPROLOL TARTRATE 1 MG: 25 TABLET, FILM COATED ORAL at 08:48

## 2018-09-09 RX ADMIN — SODIUM BICARBONATE 1 MG: 325 TABLET ORAL at 21:41

## 2018-09-09 RX ADMIN — DEXTROSE MONOHYDRATE 1 MLS/HR: 50 INJECTION, SOLUTION INTRAVENOUS at 14:08

## 2018-09-09 RX ADMIN — METRONIDAZOLE 1 MLS/HR: 500 INJECTION, SOLUTION INTRAVENOUS at 23:09

## 2018-09-09 RX ADMIN — INSULIN DETEMIR 1 UNITS: 100 INJECTION, SOLUTION SUBCUTANEOUS at 21:44

## 2018-09-09 RX ADMIN — METRONIDAZOLE 1 MLS/HR: 500 INJECTION, SOLUTION INTRAVENOUS at 00:47

## 2018-09-09 RX ADMIN — MAGNESIUM SULFATE IN DEXTROSE 1 MLS/HR: 10 INJECTION, SOLUTION INTRAVENOUS at 09:53

## 2018-09-09 RX ADMIN — SIMVASTATIN 1 MG: 20 TABLET, FILM COATED ORAL at 21:41

## 2018-09-09 RX ADMIN — FIDAXOMICIN 1 MG: 200 TABLET, FILM COATED ORAL at 08:21

## 2018-09-09 RX ADMIN — TACROLIMUS 1 MG: 1 CAPSULE ORAL at 00:10

## 2018-09-09 RX ADMIN — ONDANSETRON HYDROCHLORIDE 1 MG: 4 TABLET, FILM COATED ORAL at 18:02

## 2018-09-09 RX ADMIN — MAGNESIUM OXIDE 1 MG: 400 TABLET ORAL at 00:09

## 2018-09-09 RX ADMIN — INSULIN LISPRO 1 UNITS: 100 INJECTION, SOLUTION INTRAVENOUS; SUBCUTANEOUS at 12:00

## 2018-09-09 RX ADMIN — Medication 3 EA: at 12:23

## 2018-09-09 RX ADMIN — PROBIOTIC PRODUCT - TAB 1 EA: TAB at 00:03

## 2018-09-09 RX ADMIN — MAGNESIUM OXIDE 1 MG: 400 TABLET ORAL at 21:42

## 2018-09-09 RX ADMIN — ACETAMINOPHEN 1 MG: 325 TABLET ORAL at 22:18

## 2018-09-09 RX ADMIN — INSULIN LISPRO 1 UNITS: 100 INJECTION, SOLUTION INTRAVENOUS; SUBCUTANEOUS at 00:15

## 2018-09-09 RX ADMIN — PROBIOTIC PRODUCT - TAB 1 EA: TAB at 16:15

## 2018-09-10 LAB
ALBUMIN/GLOBULIN RATIO: 0.81 (ref 1–1.93)
ALBUMIN: 2.6 GM/DL (ref 3.2–5.2)
ALKALINE PHOSPHATASE: 71 U/L (ref 45–117)
ALT SERPL W P-5'-P-CCNC: 23 U/L (ref 12–78)
ANION GAP: 10 MEQ/L (ref 8–16)
ANION GAP: 12 MEQ/L (ref 8–16)
AST SERPL-CCNC: 15 U/L (ref 7–37)
BILIRUBIN,TOTAL: 0.3 MG/DL (ref 0.2–1)
BLOOD UREA NITROGEN: 20 MG/DL (ref 7–18)
BLOOD UREA NITROGEN: 21 MG/DL (ref 7–18)
CALCIUM LEVEL: 8.2 MG/DL (ref 8.8–10.2)
CALCIUM LEVEL: 8.4 MG/DL (ref 8.8–10.2)
CARBON DIOXIDE LEVEL: 14 MEQ/L (ref 21–32)
CARBON DIOXIDE LEVEL: 18 MEQ/L (ref 21–32)
CHLORIDE LEVEL: 109 MEQ/L (ref 98–107)
CHLORIDE LEVEL: 116 MEQ/L (ref 98–107)
CREATININE FOR GFR: 1.07 MG/DL (ref 0.55–1.3)
CREATININE FOR GFR: 1.14 MG/DL (ref 0.55–1.3)
GFR SERPL CREATININE-BSD FRML MDRD: 51.8 ML/MIN/{1.73_M2} (ref 45–?)
GFR SERPL CREATININE-BSD FRML MDRD: 55.7 ML/MIN/{1.73_M2} (ref 45–?)
GLUCOSE BLDC GLUCOMTR-MCNC: 142 MG/DL (ref 80–115)
GLUCOSE BLDC GLUCOMTR-MCNC: 181 MG/DL (ref 80–115)
GLUCOSE BLDC GLUCOMTR-MCNC: 183 MG/DL (ref 80–115)
GLUCOSE, FASTING: 149 MG/DL (ref 70–100)
GLUCOSE, FASTING: 202 MG/DL (ref 70–100)
HEMATOCRIT: 29.6 % (ref 36–47)
HEMOGLOBIN: 9.6 G/DL (ref 12–15.5)
MAGNESIUM LEVEL: 2 MG/DL (ref 1.8–2.4)
MEAN CORPUSCULAR HEMOGLOBIN: 30.1 PG (ref 27–33)
MEAN CORPUSCULAR HGB CONC: 32.4 G/DL (ref 32–36.5)
MEAN CORPUSCULAR VOLUME: 92.8 FL (ref 80–96)
NRBC BLD AUTO-RTO: 0 % (ref 0–0)
PLATELET COUNT, AUTOMATED: 223 10^3/UL (ref 150–450)
POTASSIUM SERUM: 3.6 MEQ/L (ref 3.5–5.1)
POTASSIUM SERUM: 4.1 MEQ/L (ref 3.5–5.1)
RED BLOOD COUNT: 3.19 10^6/UL (ref 4–5.4)
RED CELL DISTRIBUTION WIDTH: 14.2 % (ref 11.5–14.5)
SODIUM LEVEL: 137 MEQ/L (ref 136–145)
SODIUM LEVEL: 142 MEQ/L (ref 136–145)
TOTAL PROTEIN: 5.8 GM/DL (ref 6.4–8.2)
WHITE BLOOD COUNT: 20.8 10^3/UL (ref 4–10)

## 2018-09-10 RX ADMIN — DEXTROSE MONOHYDRATE 1 MLS/HR: 50 INJECTION, SOLUTION INTRAVENOUS at 05:37

## 2018-09-10 RX ADMIN — TACROLIMUS 1 MG: 1 CAPSULE ORAL at 09:27

## 2018-09-10 RX ADMIN — DEXTROSE MONOHYDRATE 1 MLS/HR: 50 INJECTION, SOLUTION INTRAVENOUS at 18:00

## 2018-09-10 RX ADMIN — PROBIOTIC PRODUCT - TAB 1 EA: TAB at 20:46

## 2018-09-10 RX ADMIN — PROBIOTIC PRODUCT - TAB 1 EA: TAB at 15:48

## 2018-09-10 RX ADMIN — INSULIN LISPRO 1 UNITS: 100 INJECTION, SOLUTION INTRAVENOUS; SUBCUTANEOUS at 20:29

## 2018-09-10 RX ADMIN — SODIUM CHLORIDE 1 UNITS: 4.5 INJECTION, SOLUTION INTRAVENOUS at 09:00

## 2018-09-10 RX ADMIN — FIDAXOMICIN 1 MG: 200 TABLET, FILM COATED ORAL at 09:29

## 2018-09-10 RX ADMIN — INSULIN DETEMIR 1 UNITS: 100 INJECTION, SOLUTION SUBCUTANEOUS at 20:47

## 2018-09-10 RX ADMIN — INSULIN LISPRO 2 UNITS: 100 INJECTION, SOLUTION INTRAVENOUS; SUBCUTANEOUS at 17:30

## 2018-09-10 RX ADMIN — SODIUM BICARBONATE 1 MG: 325 TABLET ORAL at 15:48

## 2018-09-10 RX ADMIN — WATER 1 MLS/HR: 100 INJECTION, SOLUTION INTRAVENOUS at 22:04

## 2018-09-10 RX ADMIN — FIDAXOMICIN 1 MG: 200 TABLET, FILM COATED ORAL at 20:46

## 2018-09-10 RX ADMIN — WATER 1 MLS/HR: 100 INJECTION, SOLUTION INTRAVENOUS at 20:05

## 2018-09-10 RX ADMIN — METOPROLOL TARTRATE 1 MG: 25 TABLET, FILM COATED ORAL at 09:28

## 2018-09-10 RX ADMIN — MAGNESIUM OXIDE 1 MG: 400 TABLET ORAL at 09:28

## 2018-09-10 RX ADMIN — METRONIDAZOLE 1 MLS/HR: 500 INJECTION, SOLUTION INTRAVENOUS at 08:00

## 2018-09-10 RX ADMIN — SODIUM CHLORIDE 1 UNITS: 4.5 INJECTION, SOLUTION INTRAVENOUS at 20:50

## 2018-09-10 RX ADMIN — SODIUM BICARBONATE 1 MG: 325 TABLET ORAL at 20:46

## 2018-09-10 RX ADMIN — PROBIOTIC PRODUCT - TAB 1 EA: TAB at 09:28

## 2018-09-10 RX ADMIN — INSULIN LISPRO 4 UNITS: 100 INJECTION, SOLUTION INTRAVENOUS; SUBCUTANEOUS at 13:51

## 2018-09-10 RX ADMIN — MAGNESIUM OXIDE 1 MG: 400 TABLET ORAL at 20:47

## 2018-09-10 RX ADMIN — WATER 1 MLS/HR: 100 INJECTION, SOLUTION INTRAVENOUS at 05:24

## 2018-09-10 RX ADMIN — TACROLIMUS 1 MG: 1 CAPSULE ORAL at 20:47

## 2018-09-10 RX ADMIN — SODIUM BICARBONATE 1 MG: 325 TABLET ORAL at 13:51

## 2018-09-10 RX ADMIN — SODIUM BICARBONATE 1 MG: 325 TABLET ORAL at 09:28

## 2018-09-10 RX ADMIN — SIMVASTATIN 1 MG: 20 TABLET, FILM COATED ORAL at 20:47

## 2018-09-10 RX ADMIN — INSULIN LISPRO 2 UNITS: 100 INJECTION, SOLUTION INTRAVENOUS; SUBCUTANEOUS at 09:29

## 2018-09-11 LAB
ALBUMIN/GLOBULIN RATIO: 1.23 (ref 1–1.93)
ALBUMIN: 2.6 GM/DL (ref 3.2–5.2)
ALKALINE PHOSPHATASE: 67 U/L (ref 45–117)
ALT SERPL W P-5'-P-CCNC: 20 U/L (ref 12–78)
ANION GAP: 8 MEQ/L (ref 8–16)
AST SERPL-CCNC: 15 U/L (ref 7–37)
BILIRUBIN,TOTAL: 0.4 MG/DL (ref 0.2–1)
BLOOD UREA NITROGEN: 11 MG/DL (ref 7–18)
CALCIUM LEVEL: 8.3 MG/DL (ref 8.8–10.2)
CARBON DIOXIDE LEVEL: 21 MEQ/L (ref 21–32)
CHLORIDE LEVEL: 112 MEQ/L (ref 98–107)
CREATININE FOR GFR: 0.84 MG/DL (ref 0.55–1.3)
GFR SERPL CREATININE-BSD FRML MDRD: > 60 ML/MIN/{1.73_M2} (ref 45–?)
GLUCOSE BLDC GLUCOMTR-MCNC: 147 MG/DL (ref 80–115)
GLUCOSE BLDC GLUCOMTR-MCNC: 68 MG/DL (ref 80–115)
GLUCOSE, FASTING: 132 MG/DL (ref 70–100)
HEMATOCRIT: 29.4 % (ref 36–47)
HEMOGLOBIN: 9.9 G/DL (ref 12–15.5)
MAGNESIUM LEVEL: 1.9 MG/DL (ref 1.8–2.4)
MEAN CORPUSCULAR HEMOGLOBIN: 30.6 PG (ref 27–33)
MEAN CORPUSCULAR HGB CONC: 33.7 G/DL (ref 32–36.5)
MEAN CORPUSCULAR VOLUME: 90.7 FL (ref 80–96)
NRBC BLD AUTO-RTO: 0 % (ref 0–0)
PLATELET COUNT, AUTOMATED: 236 10^3/UL (ref 150–450)
POTASSIUM SERUM: 3.5 MEQ/L (ref 3.5–5.1)
RED BLOOD COUNT: 3.24 10^6/UL (ref 4–5.4)
RED CELL DISTRIBUTION WIDTH: 13.7 % (ref 11.5–14.5)
SODIUM LEVEL: 141 MEQ/L (ref 136–145)
TOTAL PROTEIN: 5.8 GM/DL (ref 6.4–8.2)
WHITE BLOOD COUNT: 13.4 10^3/UL (ref 4–10)

## 2018-09-11 RX ADMIN — PROBIOTIC PRODUCT - TAB 1 EA: TAB at 09:48

## 2018-09-11 RX ADMIN — TACROLIMUS 1 MG: 1 CAPSULE ORAL at 09:49

## 2018-09-11 RX ADMIN — Medication 3 EA: at 12:15

## 2018-09-11 RX ADMIN — INSULIN LISPRO 1 UNITS: 100 INJECTION, SOLUTION INTRAVENOUS; SUBCUTANEOUS at 07:30

## 2018-09-11 RX ADMIN — METOPROLOL TARTRATE 1 MG: 25 TABLET, FILM COATED ORAL at 09:49

## 2018-09-11 RX ADMIN — FIDAXOMICIN 1 MG: 200 TABLET, FILM COATED ORAL at 09:48

## 2018-09-11 RX ADMIN — SODIUM CHLORIDE 1 UNITS: 4.5 INJECTION, SOLUTION INTRAVENOUS at 09:00

## 2018-09-11 RX ADMIN — LOSARTAN POTASSIUM 1 MG: 25 TABLET, FILM COATED ORAL at 09:50

## 2018-09-11 RX ADMIN — MAGNESIUM OXIDE 1 MG: 400 TABLET ORAL at 09:49

## 2018-09-11 RX ADMIN — DEXTROSE MONOHYDRATE 1 MLS/HR: 50 INJECTION, SOLUTION INTRAVENOUS at 06:49

## 2018-09-11 RX ADMIN — INSULIN LISPRO 2 UNITS: 100 INJECTION, SOLUTION INTRAVENOUS; SUBCUTANEOUS at 12:15

## 2018-09-12 LAB — FK 506 (TACROLIMUS) LABCORP: 8 NG/ML (ref 2–20)

## 2018-11-13 ENCOUNTER — HOSPITAL ENCOUNTER (OUTPATIENT)
Dept: HOSPITAL 53 - M LAB REF | Age: 63
End: 2018-11-13
Attending: INTERNAL MEDICINE
Payer: MEDICARE

## 2018-11-13 DIAGNOSIS — Z94.0: ICD-10-CM

## 2018-11-13 DIAGNOSIS — E78.00: ICD-10-CM

## 2018-11-13 DIAGNOSIS — Z04.71: Primary | ICD-10-CM

## 2018-11-13 LAB
CHOLEST SERPL-MCNC: 151 MG/DL (ref ?–200)
CHOLESTEROL RISK RATIO: 2.52 (ref ?–5)
HDLC SERPL-MCNC: 60 MG/DL (ref 40–?)
LDL CHOLESTEROL: 77 MG/DL (ref ?–100)
NONHDLC SERPL-MCNC: 91 MG/DL
TRIGLYCERIDES LEVEL: 72 MG/DL (ref ?–150)

## 2018-11-13 PROCEDURE — 80061 LIPID PANEL: CPT

## 2018-11-15 LAB — FK 506 (TACROLIMUS) LABCORP: 6 NG/ML (ref 2–20)

## 2018-12-07 ENCOUNTER — HOSPITAL ENCOUNTER (OUTPATIENT)
Dept: HOSPITAL 53 - M LAB REF | Age: 63
End: 2018-12-07
Attending: INTERNAL MEDICINE
Payer: MEDICARE

## 2018-12-07 DIAGNOSIS — Z94.0: Primary | ICD-10-CM

## 2018-12-07 PROCEDURE — 80197 ASSAY OF TACROLIMUS: CPT

## 2018-12-11 LAB — FK 506 (TACROLIMUS) LABCORP: 5.1 NG/ML (ref 2–20)

## 2019-03-07 ENCOUNTER — HOSPITAL ENCOUNTER (OUTPATIENT)
Dept: HOSPITAL 53 - M LAB REF | Age: 64
End: 2019-03-07
Attending: INTERNAL MEDICINE
Payer: MEDICARE

## 2019-03-07 DIAGNOSIS — Z94.0: Primary | ICD-10-CM

## 2019-07-22 ENCOUNTER — HOSPITAL ENCOUNTER (OUTPATIENT)
Dept: HOSPITAL 53 - M LAB REF | Age: 64
End: 2019-07-22
Attending: INTERNAL MEDICINE
Payer: MEDICARE

## 2019-07-22 DIAGNOSIS — Z94.0: Primary | ICD-10-CM

## 2019-09-10 NOTE — IPNPDOC
Subjective


General


Date Seen


The patient was seen on 2/10/17.





Subjective


Chief Complaint/HPI


The patient is a 61-year-old female admitted with a reason for visit of Acute 

Bronchitis Acute Kidney Injury.


Events since last encounter


cough a little better. however still cannot bring up any phlegm.





Objective


Physical Examination


General Exam:  Positive: Alert, No Acute Distress


Eye Exam:  Positive: Conjunctiva & lids normal, EOMI, PERRLA, 


   Negative: Sclera icteric


ENT Exam:  Positive: Atraumatic, Mucous membr. moist/pink, Pharynx Normal


Neck Exam:  Positive: Supple, 


   Negative: JVD, thyromegaly


Chest Exam:  Positive: Rales, Rhonchi


Heart Exam:  Positive: Normal S1, Normal S2, Rate Normal, Regular Rhythm, 


   Negative: Murmurs, Rubs


Abdomen Exam:  Positive: Normal bowel sounds, Soft, 


   Negative: Hepatospenomegaly, Tenderness


Extremity Exam:  Positive: Normal pulses, 


   Negative: Clubbing, Cyanosis, Edema





Assessment /Plan


Problems


Problems:  


(1) Acute bronchitis


Status:  Acute


Problem Text:  will continue with nebs and azithromycin





(2) SABINA (acute kidney injury)


Status:  Acute


Problem Text:  possibly from mild dehydration will continue monitor renal 

functions.





(3) Diabetes


Status:  Chronic


Problem Text:  continue with insulin





(4) Hypertension


Status:  Chronic


Problem Text:  continue with losartan and metoprolol. 





(5) Hyperlipidemia


Status:  Chronic


(6) Afib


Status:  Chronic


(7) Pleural mass


Status:  Chronic


Problem Text:  possibley from old vasculitic disease. 





(8) ANCA-positive vasculitis


Status:  Chronic


Problem Text:  history of ANCA vasculitis which caused renal failure and ESRD. 


s/p renal transplant x 2 first in 1992 second in 2014 both living donor. 


will continue with tacrolimus, prednisone. 








Plan/VTE


VTE Prophylaxis Ordered?:  Yes





VS, I&O, 24H, Fishbone


Vital Signs/I&O





 Vital Signs








  Date Time  Temp Pulse Resp B/P Pulse Ox O2 Delivery O2 Flow Rate FiO2


 


2/10/17 05:35 97.4 98 18 126/86 96 Room Air  














 I&O- Last 24 Hours up to 6 AM 


 


 2/10/17





 06:00


 


Intake Total 1080 ml


 


Output Total 1650 ml


 


Balance -570 ml











Laboratory Data


24H LABS


 Laboratory Tests 2


2/9/17 10:59: 


Anion Gap 10, White Blood Count 10.1H, Red Blood Count 4.71, Hemoglobin 14.5, 

Hematocrit 45.4, Mean Corpuscular Volume 96.4H, Mean Corpuscular Hemoglobin 30.8

, Mean Corpuscular Hemoglobin Concent 32.0, Red Cell Distribution Width 12.9, 

Platelet Count 242, Neutrophils (%) (Auto) 56.8, Lymphocytes (%) (Auto) 29.4, 

Monocytes (%) (Auto) 7.9H, Eosinophils (%) (Auto) 1.2, Basophils (%) (Auto) 0.3

, Neutrophils # (Auto) 5.7, Lymphocytes # (Auto) 3.0, Monocytes # (Auto) 0.8, 

Eosinophils # (Auto) 0.1, Basophils # (Auto) 0.0, Blood Urea Nitrogen 23H, 

Creatinine 1.17H, Sodium Level 140, Potassium Level 4.7, Chloride Level 107, 

Carbon Dioxide Level 23, Calcium Level 9.0, Glomerular Filtration Rate 50.4L, 

Large Unclassified Cells # 0.4, Large Unclassified Cells % 4.4H


2/9/17 12:53: Lactic Acid (Sepsis) 0.7


2/9/17 14:39: 


Creatine Kinase MB 1.4, Creatine Kinase MB Relative Index 1.22, Total Creatine 

Kinase 114, Troponin I 0.02


2/9/17 16:34: Bedside Glucose (Misc Panel) 136H


2/9/17 20:18: Bedside Glucose (Misc Panel) 197H


2/9/17 21:50: 


Creatine Kinase MB 1.4, Creatine Kinase MB Relative Index 1.30, Total Creatine 

Kinase 107, Troponin I 0.02


2/10/17 06:18: 


Creatine Kinase MB 1.8, Creatine Kinase MB Relative Index 1.68, Total Creatine 

Kinase 107, Troponin I < 0.02, Anion Gap 10, Blood Urea Nitrogen 16, Creatinine 

0.98, Sodium Level 144, Potassium Level 5.0, Chloride Level 115H, Carbon 

Dioxide Level 19L, Calcium Level 8.6, Glomerular Filtration Rate > 60.0, 

Magnesium Level 2.1


CBC/BMP


 Laboratory Tests


2/9/17 10:59








Calcium Level 9.0, Red Blood Count 4.71, Mean Corpuscular Volume 96.4 H, Mean 

Corpuscular Hemoglobin 30.8, Mean Corpuscular Hemoglobin Concent 32.0, Red Cell 

Distribution Width 12.9, Neutrophils (%) (Auto) 56.8, Lymphocytes (%) (Auto) 

29.4, Monocytes (%) (Auto) 7.9 H, Eosinophils (%) (Auto) 1.2, Basophils (%) (

Auto) 0.3, Neutrophils # (Auto) 5.7, Lymphocytes # (Auto) 3.0, Monocytes # (Auto

) 0.8, Eosinophils # (Auto) 0.1, Basophils # (Auto) 0.0





2/10/17 06:18








Calcium Level 8.6, Red Blood Count 4.13, Mean Corpuscular Volume 96.7 H, Mean 

Corpuscular Hemoglobin 30.5, Mean Corpuscular Hemoglobin Concent 31.6 L, Red 

Cell Distribution Width 12.8, Total Creatine Kinase 107


Microbiology





 Microbiology


2/9/17 Blood Culture, Received


         Pending


2/9/17 Blood Culture, Received


         Pending


2/9/17 Gram Stain - Final, Resulted


         


2/9/17 Sputum Culture, Resulted


         Pending


2/9/17 Respiratory Virus Panel (PCR) (VIPUL) - Final, Complete


         


2/9/17 Influenza Virus Type A Antigen - Final, Complete


         


2/9/17 Influenza Virus Type B Antigen - Final, Complete








WENDIE COMER MD Feb 10, 2017 10:21 n/a

## 2019-11-21 ENCOUNTER — HOSPITAL ENCOUNTER (OUTPATIENT)
Dept: HOSPITAL 53 - M RAD | Age: 64
End: 2019-11-21
Attending: INTERNAL MEDICINE
Payer: MEDICARE

## 2019-11-21 DIAGNOSIS — I15.0: ICD-10-CM

## 2019-11-21 DIAGNOSIS — Z94.0: Primary | ICD-10-CM

## 2019-11-21 NOTE — REP
RENAL TRANSPLANT ULTRASOUND:

 

Real-time ultrasound evaluation and duplex interrogation of the left renal

transplant performed.  Trace fluid is seen along the lower pole of the renal

transplant in the left lower quadrant.  There is mild dilatation of the renal

pelvis without calyceal dilatation.  Renal transplant measures 13.4 x 7.0 x 6.1

cm.  Resistive indices are measured in the upper, middle, and lower thirds of the

renal transplant and range between 0.73 and 0.74.  Acceleration times range

between 0.052 and 0.068.

 

Peak systolic velocity of the left iliac artery pre-anastomosis is 190 cm/s, at

the anastomosis 219 cm/s and post anastomosis 102 cm/s.  There are two renal

arteries feeding the renal transplant which connect with the external iliac

artery.  Both of these demonstrate normal flow velocities, each having a maximum

velocity at their anastomosis of 184 cm/s with lower peak systolic velocities

more distally.  Peak systolic velocity ratio of the renal artery to external

iliac artery is 0.97.  Patent flow is seen in the transplant renal vein.

 

Urinary bladder measures 9.6 x 5.9 x 4.9 cm for a total volume of 278 mL.

Ureteral jet is visualized within the left side of the bladder.

 

IMPRESSION:

 

Normal appearing renal transplant left lower quadrant as discussed in detail

above.

 

 

Electronically Signed by

Dorian Arizmendi MD 11/21/2019 11:46 A

## 2019-11-27 ENCOUNTER — HOSPITAL ENCOUNTER (OUTPATIENT)
Dept: HOSPITAL 53 - M LAB REF | Age: 64
End: 2019-11-27
Attending: INTERNAL MEDICINE
Payer: MEDICARE

## 2019-11-27 DIAGNOSIS — E78.00: Primary | ICD-10-CM

## 2019-11-27 LAB
CHOLEST SERPL-MCNC: 146 MG/DL (ref ?–200)
CHOLEST/HDLC SERPL: 2.12 {RATIO} (ref ?–5)
HDLC SERPL-MCNC: 69 MG/DL (ref 40–?)
LDLC SERPL CALC-MCNC: 58 MG/DL (ref ?–100)
NONHDLC SERPL-MCNC: 77 MG/DL
TRIGL SERPL-MCNC: 94 MG/DL (ref ?–150)

## 2019-12-16 ENCOUNTER — HOSPITAL ENCOUNTER (OUTPATIENT)
Dept: HOSPITAL 53 - M LAB REF | Age: 64
End: 2019-12-16
Attending: INTERNAL MEDICINE
Payer: MEDICARE

## 2019-12-16 DIAGNOSIS — R80.9: Primary | ICD-10-CM

## 2019-12-16 DIAGNOSIS — R31.9: ICD-10-CM

## 2019-12-16 LAB
C3 SERPL-MCNC: 86 MG/DL (ref 90–180)
C4 SERPL-MCNC: 20 MG/DL (ref 10–40)

## 2020-01-20 ENCOUNTER — HOSPITAL ENCOUNTER (OUTPATIENT)
Dept: HOSPITAL 53 - M LAB REF | Age: 65
End: 2020-01-20
Attending: INTERNAL MEDICINE
Payer: MEDICARE

## 2020-01-20 DIAGNOSIS — R31.9: ICD-10-CM

## 2020-01-20 DIAGNOSIS — Z94.0: ICD-10-CM

## 2020-01-20 DIAGNOSIS — R80.9: Primary | ICD-10-CM

## 2020-01-22 ENCOUNTER — HOSPITAL ENCOUNTER (OUTPATIENT)
Dept: HOSPITAL 53 - M LAB REF | Age: 65
End: 2020-01-22
Attending: INTERNAL MEDICINE
Payer: MEDICARE

## 2020-01-22 DIAGNOSIS — R31.9: Primary | ICD-10-CM

## 2020-01-22 LAB
ANTI-GLOMERULAR BASEMENT MEMB: 4 UNITS (ref 0–20)
INR PPP: 1.01
PROTHROMBIN TIME: 13 SECONDS (ref 11.8–14)

## 2020-01-27 ENCOUNTER — HOSPITAL ENCOUNTER (OUTPATIENT)
Dept: HOSPITAL 53 - M IRPRO | Age: 65
End: 2020-01-27
Attending: INTERNAL MEDICINE
Payer: MEDICARE

## 2020-01-27 VITALS — SYSTOLIC BLOOD PRESSURE: 159 MMHG | DIASTOLIC BLOOD PRESSURE: 86 MMHG

## 2020-01-27 DIAGNOSIS — Z94.0: ICD-10-CM

## 2020-01-27 DIAGNOSIS — R80.9: Primary | ICD-10-CM

## 2020-01-27 PROCEDURE — 99153 MOD SED SAME PHYS/QHP EA: CPT

## 2020-01-27 PROCEDURE — 77012 CT SCAN FOR NEEDLE BIOPSY: CPT

## 2020-01-27 PROCEDURE — 88300 SURGICAL PATH GROSS: CPT

## 2020-01-27 PROCEDURE — 99152 MOD SED SAME PHYS/QHP 5/>YRS: CPT

## 2020-01-27 PROCEDURE — 50200 RENAL BIOPSY PERQ: CPT

## 2020-01-27 NOTE — IRHP
[facility] IR Pre-Procedure H & P


General


Date of Service:  Jan 27, 2020


Procedure:  Same Day Surgery





Interval History and Physical


I have seen the patient and reviewed last H & P performed within 30 days. 


There is no significant interval change.





History of Present Illness


Chief Complaint


The patient is a 64-year-old female admitted with a reason for visit of 

Proteinuria, Kidney Transplant.


PRE-PROCEDURE DIAGNOSIS: kidney transplant


HEART: normal rate.


LUNGS: normal breathing at rest.





ASA Classification


ASA Classification:  III-Severe systemic dis.


Mallampati Score:  II


NPO:  Yes


Problems with prior sedation:  No


Obstructive Sleep Apnea:  No





Plan


moderate sedation





Allergies


Coded Allergies:  


     MS - Triazolam (Verified  Allergy, Mild, RASH, 1/9/18)


     MS - Hydralazine (Verified  Allergy, Unknown, 9/8/18)


   


   unknown reaction


     MS - Nifedipine (Verified  Adverse Reaction, Intermediate, TACHYCARDIA, 

1/9/18)





Home Medications


Scheduled


 (Icaps), 1 CAP PO DAILY, (Reported)


Diltiazem HCl (Diltiazem HCl), 120 MG PO BID, (Reported)


Insulin Glargine,Hum.rec.anlog (Lantus Solostar), 14 UNITS SC QHS, (Reported)


Insulin Human Lispro (Humalog), 1 DOSE SC AC, (Reported)


L.acidoph/L.rhamn/B.bif/B.long (Probiotic Acidophilus Biobeads), 1 CAP PO DAILY,

(Reported)


Losartan Potassium (Losartan Potassium), 50 MG PO DAILY, (Reported)


Magnesium Oxide (Magnesium Oxide 400), 400 MG PO BID, (Reported)


Metoprolol Tartrate (Metoprolol Tartrate), 25 MG PO DAILY, (Reported)


Mycophenolate Sodium (Mycophenolic Acid), 540 MG PO BID, (Reported)


Prednisone (Prednisone), 5 MG PO DAILY, (Reported)


Simvastatin (Simvastatin), 20 MG PO DAILY, (Reported)


Tacrolimus (Tacrolimus), 1 MG PO BID, (Reported)


Vancomycin HCl (Vancocin HCl), 125 MG PO QID.Q6hp





Scheduled PRN


Acetaminophen (Tylenol), 650 MG PO Q4H PRN for PAIN, (Reported)





VS, I&O, 24H, Fishbone


Vital Signs/I&O





Vital Signs








  Date Time  Temp Pulse Resp B/P (MAP) Pulse Ox O2 Delivery O2 Flow Rate FiO2


 


1/27/20 12:09  68 18  98 Room Air  


 


1/27/20 09:18       2 


 


1/27/20 08:20 99.0       

















TERE GERARD MD               Jan 27, 2020 14:29

## 2020-01-27 NOTE — POST-OPPD
Postoperative Procedure Note


Date Of Procedure:  Jan 27, 2020


Time Of Procedure:  14:29


PREOPERATIVE DIAGNOSIS: proteinuria. transplant kidney





POSTOPERATIVE DIAGNOSIS: same





FINDINGS: left lower quadrant transplant kidney 





PROCEDURE: kidney Bx





SURGEON: angel





ANESTHESIA: mod sed





SPECIMENS: core x 4 





ESTIMATED BLOOD LOSS: < 5 ml








COMPLICATIONS: < 5 ml





POSTOPERATIVE CONDITION: stable











TERE GERARD MD               Jan 27, 2020 14:31

## 2020-01-28 NOTE — REP
IR CT guided kidney biopsy.

 

IR moderate sedation.

 

Clinical information: Transplant kidney, Proteinuria.

 

Physician:  Dr. Duke.

 

Procedure:  The patient was advised of the benefits, risks and alternatives of

the procedure and informed consent was obtained.

 

The time-out was performed with verification of the patient's name, MRN, site of

procedure and type of procedure to be performed.  The patient was positioned in

the supine position on the table.  The site was prepped and draped in the usual

sterile fashion.

 

Moderate sedation was performed by the physician including the presence of an

independent trained observer who assisted in monitoring the patient's level of

consciousness and physiologic status.  Following the administration of Fentanyl

and Versed, the physician spent 45 minutes of continuous face to face time with

the patient.  One

 

Preliminary CT of the kidneys demonstrates transplant kidney in the left lower

quadrant. Atrophic prior transplanted kidney in the right lower quadrant.

 

The anticipated puncture site was anesthetized with lidocaine.  A 17 G coaxial

needle was inserted into the lower pole of the left lower quadrant kidney, under

intermittent CT guidance.  An 18  Gauge biopsy device was inserted through the

coaxial needle and used to obtain core biopsies of the left Kidney, under

intermittent CT guidance.

 

The specimen was labeled with the patient's name and medical record number and

sent for further analysis.

 

The coaxial needle and biopsy device were removed, pressure held and hemostasis

achieved.  A follow-up CT  through the area demonstrates small  hematoma.

 

A sterile dressing was applied to the site.

 

The patient tolerated the procedure well and was returned to the PRU in stable

condition.

 

EBL:  < 5 ml.

 

Complications: None.

 

Conclusion:

 

1.  CT demonstrates left lower quadrant transplant kidney.

 

2.  Successful CT -guided core kidney biopsy.  Patient to follow up with

referring provider for biopsy results.

 

Thank you for this referral.

 

 

Electronically Signed by

Luann Duke MD 01/28/2020 08:17 A

## 2020-01-31 ENCOUNTER — HOSPITAL ENCOUNTER (OUTPATIENT)
Dept: HOSPITAL 53 - M LAB REF | Age: 65
End: 2020-01-31
Attending: INTERNAL MEDICINE
Payer: MEDICARE

## 2020-01-31 DIAGNOSIS — R31.9: ICD-10-CM

## 2020-01-31 DIAGNOSIS — R80.9: Primary | ICD-10-CM

## 2020-02-04 LAB
C-ANCA TITR SER IF: (no result) TITER
P-ANCA ATYPICAL TITR SER IF: (no result) TITER
P-ANCA TITR SER IF: (no result) TITER

## 2020-02-10 ENCOUNTER — HOSPITAL ENCOUNTER (OUTPATIENT)
Dept: HOSPITAL 53 - M LAB REF | Age: 65
End: 2020-02-10
Attending: INTERNAL MEDICINE
Payer: MEDICARE

## 2020-02-10 DIAGNOSIS — Z79.899: ICD-10-CM

## 2020-02-10 DIAGNOSIS — Z94.0: Primary | ICD-10-CM

## 2020-02-10 DIAGNOSIS — D84.9: ICD-10-CM

## 2020-02-10 LAB
ALBUMIN SERPL BCG-MCNC: 3.3 GM/DL (ref 3.2–5.2)
BASOPHILS # BLD AUTO: 0.1 10^3/UL (ref 0–0.2)
BASOPHILS NFR BLD AUTO: 0.6 % (ref 0–1)
BUN SERPL-MCNC: 35 MG/DL (ref 7–18)
CALCIUM SERPL-MCNC: 9 MG/DL (ref 8.8–10.2)
CHLORIDE SERPL-SCNC: 113 MEQ/L (ref 98–107)
CO2 SERPL-SCNC: 21 MEQ/L (ref 21–32)
CREAT SERPL-MCNC: 1.08 MG/DL (ref 0.55–1.3)
EOSINOPHIL # BLD AUTO: 0.2 10^3/UL (ref 0–0.5)
EOSINOPHIL NFR BLD AUTO: 1.9 % (ref 0–3)
GFR SERPL CREATININE-BSD FRML MDRD: 54.7 ML/MIN/{1.73_M2} (ref 45–?)
GLUCOSE SERPL-MCNC: 175 MG/DL (ref 70–100)
HCT VFR BLD AUTO: 33.1 % (ref 36–47)
HGB BLD-MCNC: 10 G/DL (ref 12–15.5)
LYMPHOCYTES # BLD AUTO: 3 10^3/UL (ref 1.5–5)
LYMPHOCYTES NFR BLD AUTO: 26.9 % (ref 24–44)
MAGNESIUM SERPL-MCNC: 2.2 MG/DL (ref 1.8–2.4)
MCH RBC QN AUTO: 29.9 PG (ref 27–33)
MCHC RBC AUTO-ENTMCNC: 30.2 G/DL (ref 32–36.5)
MCV RBC AUTO: 99.1 FL (ref 80–96)
MONOCYTES # BLD AUTO: 0.9 10^3/UL (ref 0–0.8)
MONOCYTES NFR BLD AUTO: 8.2 % (ref 0–5)
NEUTROPHILS # BLD AUTO: 6.9 10^3/UL (ref 1.5–8.5)
NEUTROPHILS NFR BLD AUTO: 62 % (ref 36–66)
PHOSPHATE SERPL-MCNC: 3.7 MG/DL (ref 2.5–4.9)
PLATELET # BLD AUTO: 231 10^3/UL (ref 150–450)
POTASSIUM SERPL-SCNC: 5 MEQ/L (ref 3.5–5.1)
RBC # BLD AUTO: 3.34 10^6/UL (ref 4–5.4)
SODIUM SERPL-SCNC: 140 MEQ/L (ref 136–145)
WBC # BLD AUTO: 11.1 10^3/UL (ref 4–10)

## 2020-05-15 ENCOUNTER — HOSPITAL ENCOUNTER (OUTPATIENT)
Dept: HOSPITAL 53 - M INFU | Age: 65
Discharge: HOME | End: 2020-05-15
Attending: INTERNAL MEDICINE
Payer: MEDICARE

## 2020-05-15 VITALS — DIASTOLIC BLOOD PRESSURE: 70 MMHG | SYSTOLIC BLOOD PRESSURE: 170 MMHG

## 2020-05-15 VITALS — DIASTOLIC BLOOD PRESSURE: 88 MMHG | SYSTOLIC BLOOD PRESSURE: 180 MMHG

## 2020-05-15 VITALS — HEIGHT: 65 IN | BODY MASS INDEX: 19.21 KG/M2 | WEIGHT: 115.3 LBS

## 2020-05-15 DIAGNOSIS — Z94.0: Primary | ICD-10-CM

## 2020-05-15 DIAGNOSIS — Z79.899: ICD-10-CM

## 2020-05-15 DIAGNOSIS — Z88.8: ICD-10-CM

## 2020-05-15 PROCEDURE — 96365 THER/PROPH/DIAG IV INF INIT: CPT

## 2020-06-12 ENCOUNTER — HOSPITAL ENCOUNTER (OUTPATIENT)
Dept: HOSPITAL 53 - M INFU | Age: 65
Discharge: HOME | End: 2020-06-12
Attending: INTERNAL MEDICINE
Payer: MEDICARE

## 2020-06-12 VITALS — DIASTOLIC BLOOD PRESSURE: 85 MMHG | SYSTOLIC BLOOD PRESSURE: 178 MMHG

## 2020-06-12 VITALS — DIASTOLIC BLOOD PRESSURE: 90 MMHG | SYSTOLIC BLOOD PRESSURE: 185 MMHG

## 2020-06-12 VITALS — BODY MASS INDEX: 19.21 KG/M2 | WEIGHT: 115.3 LBS | HEIGHT: 65 IN

## 2020-06-12 DIAGNOSIS — Z94.0: Primary | ICD-10-CM

## 2020-06-12 DIAGNOSIS — Z88.8: ICD-10-CM

## 2020-06-12 DIAGNOSIS — Z79.899: ICD-10-CM

## 2020-06-12 PROCEDURE — 96365 THER/PROPH/DIAG IV INF INIT: CPT

## 2020-07-10 ENCOUNTER — HOSPITAL ENCOUNTER (OUTPATIENT)
Dept: HOSPITAL 53 - M INFU | Age: 65
Discharge: HOME | End: 2020-07-10
Attending: INTERNAL MEDICINE
Payer: MEDICARE

## 2020-07-10 VITALS — WEIGHT: 115.3 LBS | BODY MASS INDEX: 19.21 KG/M2 | HEIGHT: 65 IN

## 2020-07-10 VITALS — SYSTOLIC BLOOD PRESSURE: 128 MMHG | DIASTOLIC BLOOD PRESSURE: 88 MMHG

## 2020-07-10 VITALS — DIASTOLIC BLOOD PRESSURE: 58 MMHG | SYSTOLIC BLOOD PRESSURE: 110 MMHG

## 2020-07-10 DIAGNOSIS — Z94.0: Primary | ICD-10-CM

## 2020-07-10 PROCEDURE — 96365 THER/PROPH/DIAG IV INF INIT: CPT

## 2020-08-07 ENCOUNTER — HOSPITAL ENCOUNTER (OUTPATIENT)
Dept: HOSPITAL 53 - M INFU | Age: 65
Discharge: HOME | End: 2020-08-07
Attending: INTERNAL MEDICINE
Payer: MEDICARE

## 2020-08-07 DIAGNOSIS — Z94.0: Primary | ICD-10-CM

## 2020-08-07 PROCEDURE — 96365 THER/PROPH/DIAG IV INF INIT: CPT

## 2020-09-04 ENCOUNTER — HOSPITAL ENCOUNTER (OUTPATIENT)
Dept: HOSPITAL 53 - M INFU | Age: 65
Discharge: HOME | End: 2020-09-04
Attending: INTERNAL MEDICINE
Payer: MEDICARE

## 2020-09-04 VITALS — BODY MASS INDEX: 19.21 KG/M2 | HEIGHT: 65 IN | WEIGHT: 115.3 LBS

## 2020-09-04 VITALS — SYSTOLIC BLOOD PRESSURE: 155 MMHG | DIASTOLIC BLOOD PRESSURE: 78 MMHG

## 2020-09-04 VITALS — DIASTOLIC BLOOD PRESSURE: 89 MMHG | SYSTOLIC BLOOD PRESSURE: 172 MMHG

## 2020-09-04 DIAGNOSIS — Z94.0: Primary | ICD-10-CM

## 2020-09-04 DIAGNOSIS — Z88.8: ICD-10-CM

## 2020-09-04 PROCEDURE — 96365 THER/PROPH/DIAG IV INF INIT: CPT

## 2020-09-18 ENCOUNTER — HOSPITAL ENCOUNTER (OUTPATIENT)
Dept: HOSPITAL 53 - M LAB REF | Age: 65
End: 2020-09-18
Attending: INTERNAL MEDICINE
Payer: MEDICARE

## 2020-09-18 DIAGNOSIS — T86.11: Primary | ICD-10-CM

## 2020-10-02 ENCOUNTER — HOSPITAL ENCOUNTER (OUTPATIENT)
Dept: HOSPITAL 53 - M INFU | Age: 65
Discharge: HOME | End: 2020-10-02
Attending: INTERNAL MEDICINE
Payer: MEDICARE

## 2020-10-02 VITALS — WEIGHT: 115.3 LBS | BODY MASS INDEX: 19.21 KG/M2 | HEIGHT: 65 IN

## 2020-10-02 VITALS — DIASTOLIC BLOOD PRESSURE: 65 MMHG | SYSTOLIC BLOOD PRESSURE: 123 MMHG

## 2020-10-02 VITALS — SYSTOLIC BLOOD PRESSURE: 138 MMHG | DIASTOLIC BLOOD PRESSURE: 62 MMHG

## 2020-10-02 DIAGNOSIS — Z94.0: Primary | ICD-10-CM

## 2020-10-02 PROCEDURE — 96365 THER/PROPH/DIAG IV INF INIT: CPT

## 2020-10-02 RX ADMIN — BELATACEPT ONE MLS/HR: 250 INJECTION, POWDER, LYOPHILIZED, FOR SOLUTION INTRAVENOUS at 12:01

## 2020-11-06 ENCOUNTER — HOSPITAL ENCOUNTER (OUTPATIENT)
Dept: HOSPITAL 53 - M INFU | Age: 65
Discharge: HOME | End: 2020-11-06
Attending: INTERNAL MEDICINE
Payer: MEDICARE

## 2020-11-06 VITALS — SYSTOLIC BLOOD PRESSURE: 172 MMHG | DIASTOLIC BLOOD PRESSURE: 84 MMHG

## 2020-11-06 VITALS — WEIGHT: 115.3 LBS | HEIGHT: 65 IN | BODY MASS INDEX: 19.21 KG/M2

## 2020-11-06 VITALS — SYSTOLIC BLOOD PRESSURE: 150 MMHG | DIASTOLIC BLOOD PRESSURE: 80 MMHG

## 2020-11-06 DIAGNOSIS — Z88.8: ICD-10-CM

## 2020-11-06 DIAGNOSIS — Z94.0: Primary | ICD-10-CM

## 2020-11-06 PROCEDURE — 96365 THER/PROPH/DIAG IV INF INIT: CPT

## 2020-12-04 ENCOUNTER — HOSPITAL ENCOUNTER (OUTPATIENT)
Dept: HOSPITAL 53 - M INFU | Age: 65
Discharge: HOME | End: 2020-12-04
Attending: INTERNAL MEDICINE
Payer: MEDICARE

## 2020-12-04 VITALS — WEIGHT: 115.3 LBS | HEIGHT: 65 IN | BODY MASS INDEX: 19.21 KG/M2

## 2020-12-04 VITALS — DIASTOLIC BLOOD PRESSURE: 73 MMHG | SYSTOLIC BLOOD PRESSURE: 144 MMHG

## 2020-12-04 DIAGNOSIS — Z88.8: ICD-10-CM

## 2020-12-04 DIAGNOSIS — Z94.0: Primary | ICD-10-CM

## 2020-12-04 PROCEDURE — 96365 THER/PROPH/DIAG IV INF INIT: CPT

## 2021-01-04 ENCOUNTER — HOSPITAL ENCOUNTER (OUTPATIENT)
Dept: HOSPITAL 53 - M INFU | Age: 66
Discharge: HOME | End: 2021-01-04
Attending: INTERNAL MEDICINE
Payer: MEDICARE

## 2021-01-04 VITALS — WEIGHT: 115.3 LBS | HEIGHT: 65 IN | BODY MASS INDEX: 19.21 KG/M2

## 2021-01-04 VITALS — DIASTOLIC BLOOD PRESSURE: 88 MMHG | SYSTOLIC BLOOD PRESSURE: 168 MMHG

## 2021-01-04 VITALS — DIASTOLIC BLOOD PRESSURE: 61 MMHG | SYSTOLIC BLOOD PRESSURE: 124 MMHG

## 2021-01-04 DIAGNOSIS — Z88.8: ICD-10-CM

## 2021-01-04 DIAGNOSIS — Z94.0: Primary | ICD-10-CM

## 2021-01-04 PROCEDURE — 96365 THER/PROPH/DIAG IV INF INIT: CPT

## 2021-01-05 ENCOUNTER — HOSPITAL ENCOUNTER (OUTPATIENT)
Dept: HOSPITAL 53 - M INFU | Age: 66
Discharge: HOME | End: 2021-01-05
Attending: INTERNAL MEDICINE
Payer: MEDICARE

## 2021-01-05 VITALS — BODY MASS INDEX: 18.87 KG/M2 | WEIGHT: 113.23 LBS | HEIGHT: 65 IN

## 2021-01-05 VITALS — SYSTOLIC BLOOD PRESSURE: 142 MMHG | DIASTOLIC BLOOD PRESSURE: 63 MMHG

## 2021-01-05 VITALS — DIASTOLIC BLOOD PRESSURE: 68 MMHG | SYSTOLIC BLOOD PRESSURE: 152 MMHG

## 2021-01-05 VITALS — SYSTOLIC BLOOD PRESSURE: 162 MMHG | DIASTOLIC BLOOD PRESSURE: 74 MMHG

## 2021-01-05 DIAGNOSIS — Z94.0: Primary | ICD-10-CM

## 2021-01-05 DIAGNOSIS — Z88.8: ICD-10-CM

## 2021-01-05 PROCEDURE — 96366 THER/PROPH/DIAG IV INF ADDON: CPT

## 2021-01-05 PROCEDURE — 96365 THER/PROPH/DIAG IV INF INIT: CPT

## 2021-02-01 ENCOUNTER — HOSPITAL ENCOUNTER (OUTPATIENT)
Dept: HOSPITAL 53 - M INFU | Age: 66
Discharge: HOME | End: 2021-02-01
Attending: INTERNAL MEDICINE
Payer: MEDICARE

## 2021-02-01 VITALS — BODY MASS INDEX: 19.21 KG/M2 | HEIGHT: 65 IN | WEIGHT: 115.3 LBS

## 2021-02-01 VITALS — SYSTOLIC BLOOD PRESSURE: 110 MMHG | DIASTOLIC BLOOD PRESSURE: 60 MMHG

## 2021-02-01 VITALS — SYSTOLIC BLOOD PRESSURE: 104 MMHG | DIASTOLIC BLOOD PRESSURE: 57 MMHG

## 2021-02-01 DIAGNOSIS — Z94.0: Primary | ICD-10-CM

## 2021-02-01 DIAGNOSIS — Z88.8: ICD-10-CM

## 2021-02-01 PROCEDURE — 96365 THER/PROPH/DIAG IV INF INIT: CPT

## 2021-03-01 ENCOUNTER — HOSPITAL ENCOUNTER (OUTPATIENT)
Dept: HOSPITAL 53 - M INFU | Age: 66
Discharge: HOME | End: 2021-03-01
Attending: INTERNAL MEDICINE
Payer: MEDICARE

## 2021-03-01 VITALS — HEIGHT: 66 IN | BODY MASS INDEX: 18.53 KG/M2 | WEIGHT: 115.3 LBS

## 2021-03-01 VITALS — SYSTOLIC BLOOD PRESSURE: 130 MMHG | DIASTOLIC BLOOD PRESSURE: 68 MMHG

## 2021-03-01 VITALS — DIASTOLIC BLOOD PRESSURE: 61 MMHG | SYSTOLIC BLOOD PRESSURE: 125 MMHG

## 2021-03-01 DIAGNOSIS — Z88.8: ICD-10-CM

## 2021-03-01 DIAGNOSIS — Z94.0: Primary | ICD-10-CM

## 2021-03-01 PROCEDURE — 96365 THER/PROPH/DIAG IV INF INIT: CPT

## 2021-04-01 ENCOUNTER — HOSPITAL ENCOUNTER (OUTPATIENT)
Dept: HOSPITAL 53 - M INFU | Age: 66
Discharge: HOME | End: 2021-04-01
Attending: INTERNAL MEDICINE
Payer: MEDICARE

## 2021-04-01 VITALS — BODY MASS INDEX: 18.53 KG/M2 | WEIGHT: 115.3 LBS | HEIGHT: 66 IN

## 2021-04-01 VITALS — DIASTOLIC BLOOD PRESSURE: 76 MMHG | SYSTOLIC BLOOD PRESSURE: 155 MMHG

## 2021-04-01 VITALS — SYSTOLIC BLOOD PRESSURE: 152 MMHG | DIASTOLIC BLOOD PRESSURE: 76 MMHG

## 2021-04-01 VITALS — SYSTOLIC BLOOD PRESSURE: 106 MMHG | DIASTOLIC BLOOD PRESSURE: 67 MMHG

## 2021-04-01 DIAGNOSIS — Z94.0: Primary | ICD-10-CM

## 2021-04-01 DIAGNOSIS — Z88.8: ICD-10-CM

## 2021-04-01 PROCEDURE — 96365 THER/PROPH/DIAG IV INF INIT: CPT

## 2021-05-03 ENCOUNTER — HOSPITAL ENCOUNTER (OUTPATIENT)
Dept: HOSPITAL 53 - M INFU | Age: 66
Discharge: HOME | End: 2021-05-03
Attending: INTERNAL MEDICINE
Payer: MEDICARE

## 2021-05-03 VITALS — BODY MASS INDEX: 19.03 KG/M2 | WEIGHT: 114.22 LBS | HEIGHT: 65 IN

## 2021-05-03 VITALS — DIASTOLIC BLOOD PRESSURE: 81 MMHG | SYSTOLIC BLOOD PRESSURE: 171 MMHG

## 2021-05-03 VITALS — SYSTOLIC BLOOD PRESSURE: 111 MMHG | DIASTOLIC BLOOD PRESSURE: 58 MMHG

## 2021-05-03 DIAGNOSIS — Z94.0: Primary | ICD-10-CM

## 2021-05-03 DIAGNOSIS — Z88.8: ICD-10-CM

## 2021-05-03 PROCEDURE — 96365 THER/PROPH/DIAG IV INF INIT: CPT

## 2021-05-16 ENCOUNTER — HOSPITAL ENCOUNTER (OUTPATIENT)
Dept: HOSPITAL 53 - M MSPAV | Age: 66
Setting detail: OBSERVATION
LOS: 2 days | Discharge: HOME | End: 2021-05-18
Attending: INTERNAL MEDICINE | Admitting: INTERNAL MEDICINE
Payer: MEDICARE

## 2021-05-16 VITALS — BODY MASS INDEX: 19.65 KG/M2 | HEIGHT: 64 IN | WEIGHT: 115.08 LBS

## 2021-05-16 VITALS — DIASTOLIC BLOOD PRESSURE: 79 MMHG | SYSTOLIC BLOOD PRESSURE: 123 MMHG

## 2021-05-16 VITALS — DIASTOLIC BLOOD PRESSURE: 86 MMHG | SYSTOLIC BLOOD PRESSURE: 173 MMHG

## 2021-05-16 VITALS — DIASTOLIC BLOOD PRESSURE: 80 MMHG | SYSTOLIC BLOOD PRESSURE: 160 MMHG

## 2021-05-16 VITALS — SYSTOLIC BLOOD PRESSURE: 167 MMHG | DIASTOLIC BLOOD PRESSURE: 77 MMHG

## 2021-05-16 VITALS — DIASTOLIC BLOOD PRESSURE: 72 MMHG | SYSTOLIC BLOOD PRESSURE: 141 MMHG

## 2021-05-16 DIAGNOSIS — Z88.8: ICD-10-CM

## 2021-05-16 DIAGNOSIS — Z79.4: ICD-10-CM

## 2021-05-16 DIAGNOSIS — R00.1: ICD-10-CM

## 2021-05-16 DIAGNOSIS — Z79.52: ICD-10-CM

## 2021-05-16 DIAGNOSIS — R55: Primary | ICD-10-CM

## 2021-05-16 DIAGNOSIS — F12.10: ICD-10-CM

## 2021-05-16 DIAGNOSIS — E10.9: ICD-10-CM

## 2021-05-16 DIAGNOSIS — F17.218: ICD-10-CM

## 2021-05-16 DIAGNOSIS — E78.49: ICD-10-CM

## 2021-05-16 DIAGNOSIS — Z94.0: ICD-10-CM

## 2021-05-16 DIAGNOSIS — I10: ICD-10-CM

## 2021-05-16 DIAGNOSIS — Z79.899: ICD-10-CM

## 2021-05-16 DIAGNOSIS — F19.20: ICD-10-CM

## 2021-05-16 DIAGNOSIS — K21.9: ICD-10-CM

## 2021-05-16 LAB
APPEARANCE UR: CLEAR
BACTERIA UR QL AUTO: (no result)
BASOPHILS # BLD AUTO: 0.1 10^3/UL (ref 0–0.2)
BASOPHILS NFR BLD AUTO: 0.6 % (ref 0–1)
BILIRUB UR QL STRIP.AUTO: NEGATIVE
BUN SERPL-MCNC: 31 MG/DL (ref 7–18)
CALCIUM SERPL-MCNC: 8.6 MG/DL (ref 8.8–10.2)
CHLORIDE SERPL-SCNC: 113 MEQ/L (ref 98–107)
CO2 SERPL-SCNC: 23 MEQ/L (ref 21–32)
CREAT SERPL-MCNC: 0.88 MG/DL (ref 0.55–1.3)
EOSINOPHIL # BLD AUTO: 0.3 10^3/UL (ref 0–0.5)
EOSINOPHIL NFR BLD AUTO: 2.4 % (ref 0–3)
EST. AVERAGE GLUCOSE BLD GHB EST-MCNC: 146 MG/DL (ref 60–110)
GFR SERPL CREATININE-BSD FRML MDRD: > 60 ML/MIN/{1.73_M2} (ref 45–?)
GLUCOSE SERPL-MCNC: 82 MG/DL (ref 70–100)
GLUCOSE UR QL STRIP.AUTO: NEGATIVE MG/DL
HCT VFR BLD AUTO: 27.5 % (ref 36–47)
HGB BLD-MCNC: 8.8 G/DL (ref 12–15.5)
HGB UR QL STRIP.AUTO: NEGATIVE
KETONES UR QL STRIP.AUTO: NEGATIVE MG/DL
LEUKOCYTE ESTERASE UR QL STRIP.AUTO: NEGATIVE
LYMPHOCYTES # BLD AUTO: 2.5 10^3/UL (ref 1.5–5)
LYMPHOCYTES NFR BLD AUTO: 22.4 % (ref 24–44)
MCH RBC QN AUTO: 32.4 PG (ref 27–33)
MCHC RBC AUTO-ENTMCNC: 32 G/DL (ref 32–36.5)
MCV RBC AUTO: 101.1 FL (ref 80–96)
MONOCYTES # BLD AUTO: 1.5 10^3/UL (ref 0–0.8)
MONOCYTES NFR BLD AUTO: 13.7 % (ref 2–8)
MUCOUS THREADS URNS QL MICRO: (no result)
NEUTROPHILS # BLD AUTO: 6.7 10^3/UL (ref 1.5–8.5)
NEUTROPHILS NFR BLD AUTO: 60.3 % (ref 36–66)
NITRITE UR QL STRIP.AUTO: NEGATIVE
NT-PRO BNP: 305 PG/ML (ref ?–125)
PH UR STRIP.AUTO: 6 UNITS (ref 5–9)
PLATELET # BLD AUTO: 208 10^3/UL (ref 150–450)
POTASSIUM SERPL-SCNC: 4.3 MEQ/L (ref 3.5–5.1)
PROT UR QL STRIP.AUTO: (no result) MG/DL
RBC # BLD AUTO: 2.72 10^6/UL (ref 4–5.4)
RBC # UR AUTO: 2 /HPF (ref 0–3)
SODIUM SERPL-SCNC: 143 MEQ/L (ref 136–145)
SP GR UR STRIP.AUTO: 1.01 (ref 1–1.03)
SQUAMOUS #/AREA URNS AUTO: 0 /HPF (ref 0–6)
UROBILINOGEN UR QL STRIP.AUTO: 0.2 MG/DL (ref 0–2)
WBC # BLD AUTO: 11 10^3/UL (ref 4–10)
WBC #/AREA URNS AUTO: 0 /HPF (ref 0–3)

## 2021-05-16 PROCEDURE — 83880 ASSAY OF NATRIURETIC PEPTIDE: CPT

## 2021-05-16 PROCEDURE — 80048 BASIC METABOLIC PNL TOTAL CA: CPT

## 2021-05-16 PROCEDURE — 93306 TTE W/DOPPLER COMPLETE: CPT

## 2021-05-16 PROCEDURE — 93880 EXTRACRANIAL BILAT STUDY: CPT

## 2021-05-16 PROCEDURE — 83036 HEMOGLOBIN GLYCOSYLATED A1C: CPT

## 2021-05-16 PROCEDURE — 85027 COMPLETE CBC AUTOMATED: CPT

## 2021-05-16 PROCEDURE — 85025 COMPLETE CBC W/AUTO DIFF WBC: CPT

## 2021-05-16 PROCEDURE — 36415 COLL VENOUS BLD VENIPUNCTURE: CPT

## 2021-05-16 PROCEDURE — 84484 ASSAY OF TROPONIN QUANT: CPT

## 2021-05-16 PROCEDURE — 93005 ELECTROCARDIOGRAM TRACING: CPT

## 2021-05-16 PROCEDURE — 80053 COMPREHEN METABOLIC PANEL: CPT

## 2021-05-16 PROCEDURE — 83735 ASSAY OF MAGNESIUM: CPT

## 2021-05-16 PROCEDURE — 81001 URINALYSIS AUTO W/SCOPE: CPT

## 2021-05-16 RX ADMIN — INSULIN LISPRO SCH UNITS: 100 INJECTION, SOLUTION INTRAVENOUS; SUBCUTANEOUS at 18:27

## 2021-05-16 RX ADMIN — DOCUSATE SODIUM SCH MG: 100 CAPSULE, LIQUID FILLED ORAL at 20:19

## 2021-05-16 RX ADMIN — DOCUSATE SODIUM SCH MG: 100 CAPSULE, LIQUID FILLED ORAL at 09:00

## 2021-05-16 RX ADMIN — FUROSEMIDE SCH MG: 20 TABLET ORAL at 09:00

## 2021-05-16 RX ADMIN — METOPROLOL TARTRATE SCH MG: 25 TABLET, FILM COATED ORAL at 20:19

## 2021-05-16 RX ADMIN — SODIUM BICARBONATE SCH MG: 325 TABLET ORAL at 09:35

## 2021-05-16 RX ADMIN — I-VITE, TAB 1000-60-2MG (60/BT) SCH TAB: TAB at 09:36

## 2021-05-16 RX ADMIN — INSULIN DETEMIR SCH UNITS: 100 INJECTION, SOLUTION SUBCUTANEOUS at 20:18

## 2021-05-16 RX ADMIN — INSULIN LISPRO SCH UNITS: 100 INJECTION, SOLUTION INTRAVENOUS; SUBCUTANEOUS at 06:00

## 2021-05-16 RX ADMIN — SIMVASTATIN SCH MG: 20 TABLET, FILM COATED ORAL at 20:18

## 2021-05-16 RX ADMIN — PROBIOTIC PRODUCT - TAB SCH EA: TAB at 09:35

## 2021-05-16 RX ADMIN — Medication SCH EA: at 20:16

## 2021-05-16 RX ADMIN — INSULIN LISPRO SCH UNITS: 100 INJECTION, SOLUTION INTRAVENOUS; SUBCUTANEOUS at 11:43

## 2021-05-16 RX ADMIN — METOPROLOL TARTRATE SCH MG: 25 TABLET, FILM COATED ORAL at 09:36

## 2021-05-16 NOTE — REP
INDICATION:

syncope



COMPARISON:

None.



TECHNIQUE:

Real-time ultrasound evaluation and duplex Doppler interrogation of the extracranial

carotid vasculature is performed.



FINDINGS:

There is mild plaquing and narrowing in both carotid bulbs extending into the internal

and external carotid arteries.  Luminal narrowing is less than 50%.  There is no

evidence of hemodynamically significant stenosis of either internal carotid artery.

Normal flow velocities are seen. The vertebral arteries demonstrate normal direction

of flow.



RIGHT                       LEFT

Peak systolic velocity ICA                   67.8 cm/s                     106.5 cm/s

End diastolic velocity ICA                   20.2 cm/s                     41.5 cm/s

Peak systolic velocity CCA                  88.7 cm/s                     163.7cm/s

Peak systolic velocity ECA                  102.2 cm/s                     87.6 cm/s

ICA/CCA ratio                                       0.8 0.7



IMPRESSION:

Bilateral luminal narrowing of the internal carotid arteries less than 50%.  No

evidence of hemodynamically significant stenosis.





<Electronically signed by Dorian Arizmendi > 05/16/21 [id number]

## 2021-05-16 NOTE — HPEPDOC
[facility] Medical History & Physical


Date of Admission


May 16, 2021


Date of Service:  May 16, 2021


Attending Physician:  RUSLAN MAY MD





History and Physical


CHIEF COMPLAINT: [64 y/o F transfer from [place] after syncopal episode]





HISTORY OF PRESENT ILLNESS: [This is a 64 y/o F with a pmh of htn, gerd, cdiff, 

kidney transplant, and DM1 d/t pancreatic resection who presented to [facility] ED late last night after a syncopal episode. Patient states that she had 

two drinks and smoked a small amount of marijuana with her family when she celina

denly became dizzy and the next thing she knew she was lying on the floor. 

Patient states that her family told her that she went forward in her chair and 

hit her head on the table. Family then moved her to the floor and began chest 

compressions as they believed she was having a cardiac event. Patient states at 

this time she woke up and felt groggy and confused. Workup in [place] ED was 

unremarkable.





As of now, patient states that she just feels tired and hungry. Patient states 

that she had one episode of emesis at [place] just before her transfer and 

blames her hunger on this. Patient states she is no longer nauseated at this hilario

e. Patient currently denies vision changes, dizziness, lightheadedness, 

confusion, chest pain, sob, peripheral edema, fevers, chills. ]





PAST MEDICAL HISTORY:


1. [See HPI





PAST SURGICAL HISTORY:


1. [B/L cataract removal].


2. [Cholecystectomy].


3. [Renal transplant


4. Pancreatic resection


5. Splenectomy


6. L rotator cuff repair


7. B/L hip arthroplasty].





SOCIAL HISTORY:


Tobacco use:[Current smoker] 


ETOH: [Admits to occasional alcohol use]


Illicit drug use: [Admits to occasional marijuana use]





FAMILY HISTORY:


Reviewed - none pertinent





ALLERGIES: Please see below.





REVIEW OF SYSTEMS:


CONSTITUTIONAL: [See HPI].


HEENT: [Denies URI sx].


CARDIOVASCULAR: [See HPI].


RESPIRATORY: [See HPI].


GASTROINTESTINAL: [Denies abd pain, n/v/d/c.].


GENITOURINARY: [Denies dysuria].


SKIN: [Denies rash].


MUSCULOSKELETAL: [Denies acute joint/back pain.].


NEUROLOGICAL: [Denies paresthesias].


ENDOCRINE: [DM1].


HEMATOLOGIC/LYMPHATIC: [Denies easy bruising].





HOME MEDICATIONS: Please see below. 





PHYSICAL EXAMINATION:


VITAL SIGNS: Please see below.


GENERAL APPEARANCE: [This is a chronically ill appearing 64 y/o female. she is 

laying in bed no respiratory distress.]. 


HEENT: [Mild ecchymoses to left side of forehead. No mass or lesion. EOMI. No 

scleral icterus or conjunctival erythema. Nares patent. oral mucosa dry without 

erythema.].


CARDIOVASCULAR: [Regular rate, rhythm. 2/6 blowing murmur appreciated at the up

per right sternal border.].


LUNGS: [Good air flow appreciated b/l. No wheezing, rales, rhonchi.].


ABDOMEN: [Soft, non-tender].


MUSCULOSKELETAL: [No joint deformity].


EXTREMITIES: [No peripheral edema. No overlying skin changes. Pulses intact.].


NEUROLOGICAL: [Sensation intact. Patient moves all fours freely on exam. Speech 

clear. A+Ox3. No focal deficits].


PSYCHIATRIC: [Mood and affect appear appropriate.].





LABORATORY DATA: See below.





IMAGING: [CXR, CT Head performed at [place] unremarkable.]





MICROBIOLOGY: Please see below. 





ASSESSMENT: [This is a 64 y/o F with a pmh of htn, gerd, cdiff, kidney 

transplant, and DM1 d/t pancreatic resection who presented to [facility] ED 

late last night after a syncopal episode. Patient enjoyed a few alcoholic drinks

 and some marijuana use tonight before becoming dizzy and passing out in her 

chair. Patient transferred to us for syncope w/u and management of patient's 

complicated pmh. ].


.


PLAN:


1. [Syncope


- Most likely d/t concomitant alcohol and marijuana use. Other ddx include tia.


- Will order echo w bubble study, carotid us, bnp


- Will admit patient under obs to med surg





2. S/P renal transplant


- renal function at baseline at [place] - cr 1.3


- Continue immunosuppressive mycophenalate, prednisone





3. DM1


- sliding scale, hypoglycemic protocol


- continue at home basal insulin





4. HTN


- continue metoprolol, cozaar, lasix, cardizem





5. HLD


- continue simvastatin





DVT prophylaxis


- Teds and SCDs].





Vital Signs





Vital Signs








  Date Time  Temp Pulse Resp B/P (MAP) Pulse Ox O2 Delivery O2 Flow Rate FiO2


 


5/16/21 02:12 98.2 80 18 167/77 (107) 97 Room Air  











Home Medications


Scheduled


B2/Vits A,C,E/Lut/Zeaxanth/Min (Icaps Tablet) 1 Each Tablet.er, 1 TAB PO DAILY


Belatacept IV (Nulojix) 250 Mg Vial, 250 MG IV QMONTH


   BEGINNING OF MONTH 


Diltiazem HCl (Diltiazem HCl) 120 Mg Tab, 120 MG PO BID


Ergocalciferol (Vitamin D2) (Vitamin D2) 50,000 Units Cap, 50,000 UNITS PO QWEEK


   FRIDAY 


Furosemide (Lasix) 20 Mg Tablet, 20 MG PO DAILY


Insulin Glargine,Hum.rec.anlog (Lantus Solostar) 100 Unit/Ml Inj, 14 UNITS SC 

QHS


   12-14 UNITS 


Insulin Human Lispro (Humalog) 1 Units/0.01 Ml Inj, 1 DOSE SC AC


L.acidoph/L.rhamn/B.bif/B.long (Probiotic Acidophilus Biobeads) 1 Cap Cap, 1 CAP

PO DAILY


Losartan Potassium (Losartan Potassium) 50 Mg Tab, 50 MG PO DAILY


Metoprolol Tartrate (Metoprolol Tartrate) 25 Mg Tab, 25 MG PO BID


Mycophenolate Sodium (Mycophenolic Acid) 180 Mg Tab, 540 MG PO BID


Prednisone (Prednisone) 5 Mg Tab, 5 MG PO QHS


Simvastatin (Simvastatin) 20 Mg Tab, 20 MG PO QHS


Sodium Bicarbonate (Sodium Bicarbonate) 325 Mg Tablet, 325 MG PO DAILY





Scheduled PRN


Acetaminophen (Acetaminophen) 325 Mg Tablet, 650 MG PO Q4H PRN for PAIN / FEVER





Allergies


Coded Allergies:  


     triazolam (Verified  Allergy, Mild, RASH, 5/15/20)


     hydralazine (Verified  Allergy, Unknown, 5/15/20)


     nifedipine (Verified  Adverse Reaction, Mild, TACHYCARDIA, 5/15/20)





A-FIB/CHADSVASC


A-FIB History


Current/History of A-Fib/PAF?:  No





Attending Note


Attending Note


time of service 645am





Ms. Haas is a 65 yr old F w DM1, HTN, DLP , ANCA vasculitis s/p renal 

transplant x2, recurrent C diff infections who was transferred from [place] for

evaluation of syncope (after drinking whisky and THC; she is also slightly 

hypoglycemic.  





Plan: telemetry / monitor FSBS frequently / hypoglycemia protocol / reduce 

levemir from 15 to 5 units daily / check orthostats / we will ask the day time 

team to consult Nephrology





rest per CRISTHIAN Diaz's H&P











RANJIT DIAZ            May 16, 2021 03:52


RUSLAN MAY MD                May 16, 2021 06:28

## 2021-05-16 NOTE — IPNPDOC
Text Note


Date of Service


The patient was seen on 5/16/21.





NOTE


SUBJECTIVE:


-Feels much better, has a slight headache





PHYSICAL EXAMINATION:


VITAL SIGNS: Please see below.


GENERAL APPEARANCE: 64 y/o W in no acute distress


HEENT: Resolving mild ecchymoses to left side of forehead. No swelling or 

lesion, EOMI, anicteric, MMM 


CARDIOVASCULAR: Regular rate, rhythm. 2/6 holosystolic murmur throughout 

precordium, loudest at RUSB and radiating to the carotids


LUNGS: CTAB


ABDOMEN: Normoactive sounds, soft, NTND


EXTREMITIES: No peripheral edema. No overlying skin changes. Pulses intact.


NEUROLOGICAL: Sensation intact. Normal gait, Speech clear. A+Ox3. No focal 

deficits


PSYCHIATRIC: Aox3, Mood and affect appear appropriate





LABORATORY DATA: Reviewed


WBC 11


Hgb 8.8


Platelets 208


Na 143


K 4.3


BUN 31


Cr 0.88





IMAGING: 





CXR, CT Head performed at [place] unremarkable.





Carotid US:


There is mild plaquing and narrowing in both carotid bulbs extending into the 

internal


and external carotid arteries.  Luminal narrowing is less than 50%.  There is no


evidence of hemodynamically significant stenosis of either internal carotid 

artery.


Normal flow velocities are seen. The vertebral arteries demonstrate normal 

direction


of flow.





RIGHT                       LEFT


Peak systolic velocity ICA                   67.8 cm/s                     106.5

cm/s


End diastolic velocity ICA                   20.2 cm/s                     41.5 

cm/s


Peak systolic velocity CCA                  88.7 cm/s                     

163.7cm/s


Peak systolic velocity ECA                  102.2 cm/s                     87.6 

cm/s


ICA/CCA ratio                                       0.8 0.7





IMPRESSION:


Bilateral luminal narrowing of the internal carotid arteries less than 50%.  No


evidence of hemodynamically significant stenosis.





MICROBIOLOGY: Please see below. 





ASSESSMENT: 64 y/o W with a pmh of htn, gerd, cdiff, kidney transplant, and DM1 

d/t pancreatic resection who presented to [facility] ED after a syncopal 

episode after a few alcoholic drinks and some marijuana use before becoming 

dizzy and passing out in her chair and transferred to [facility] for syncope w/u and 

management of patient's complicated chronic conditions.





PLAN:


Syncope


- Most likely d/t concomitant alcohol and marijuana use. Other ddx include tia.


-TTE pending


-Carotid US showed no clinically significant stenosis that could be responsible 

for syncope


-Telemetry


-PT/OT


-Orthostatic vital signs








2. S/P renal transplant


- renal function at baseline 


- Continue immunosuppressive mycophenalate, prednisone


- nephrology consult





3. DM1


- sliding scale, hypoglycemic protocol


- continue at home basal insulin





4. HTN


- continue metoprolol, cozaar, lasix, cardizem





5. HLD


- continue simvastatin





DVT prophylaxis


- Teds and SCDs.





VS,Fishbone, I+O


VS, Fishbone, I+O


Laboratory Tests


5/16/21 05:23








5/16/21 05:33











Vital Signs








  Date Time  Temp Pulse Resp B/P (MAP) Pulse Ox O2 Delivery O2 Flow Rate FiO2


 


5/16/21 10:47    123/79    


 


5/16/21 09:35  77      


 


5/16/21 06:00 98.9  18  98 Room Air  














I&O- Last 24 Hours up to 6 AM 


 


 5/16/21





 06:00


 


Intake Total 120 ml


 


Output Total 800 ml


 


Balance -680 ml

















CARRIE MART MD   May 16, 2021 11:13

## 2021-05-17 VITALS — DIASTOLIC BLOOD PRESSURE: 76 MMHG | SYSTOLIC BLOOD PRESSURE: 168 MMHG

## 2021-05-17 VITALS — SYSTOLIC BLOOD PRESSURE: 160 MMHG | DIASTOLIC BLOOD PRESSURE: 64 MMHG

## 2021-05-17 VITALS — DIASTOLIC BLOOD PRESSURE: 68 MMHG | SYSTOLIC BLOOD PRESSURE: 152 MMHG

## 2021-05-17 VITALS — DIASTOLIC BLOOD PRESSURE: 72 MMHG | SYSTOLIC BLOOD PRESSURE: 153 MMHG

## 2021-05-17 VITALS — SYSTOLIC BLOOD PRESSURE: 142 MMHG | DIASTOLIC BLOOD PRESSURE: 80 MMHG

## 2021-05-17 LAB
ALBUMIN SERPL BCG-MCNC: 3.4 GM/DL (ref 3.2–5.2)
ALT SERPL W P-5'-P-CCNC: 20 U/L (ref 12–78)
BILIRUB SERPL-MCNC: 0.4 MG/DL (ref 0.2–1)
BUN SERPL-MCNC: 27 MG/DL (ref 7–18)
CALCIUM SERPL-MCNC: 9.4 MG/DL (ref 8.8–10.2)
CHLORIDE SERPL-SCNC: 115 MEQ/L (ref 98–107)
CO2 SERPL-SCNC: 21 MEQ/L (ref 21–32)
CREAT SERPL-MCNC: 0.88 MG/DL (ref 0.55–1.3)
GFR SERPL CREATININE-BSD FRML MDRD: > 60 ML/MIN/{1.73_M2} (ref 45–?)
GLUCOSE SERPL-MCNC: 76 MG/DL (ref 70–100)
HCT VFR BLD AUTO: 32.2 % (ref 36–47)
HGB BLD-MCNC: 10.2 G/DL (ref 12–15.5)
MAGNESIUM SERPL-MCNC: 2.1 MG/DL (ref 1.8–2.4)
MCH RBC QN AUTO: 32.8 PG (ref 27–33)
MCHC RBC AUTO-ENTMCNC: 31.7 G/DL (ref 32–36.5)
MCV RBC AUTO: 103.5 FL (ref 80–96)
PLATELET # BLD AUTO: 233 10^3/UL (ref 150–450)
POTASSIUM SERPL-SCNC: 4.7 MEQ/L (ref 3.5–5.1)
PROT SERPL-MCNC: 6 GM/DL (ref 6.4–8.2)
RBC # BLD AUTO: 3.11 10^6/UL (ref 4–5.4)
SODIUM SERPL-SCNC: 143 MEQ/L (ref 136–145)
WBC # BLD AUTO: 10.3 10^3/UL (ref 4–10)

## 2021-05-17 RX ADMIN — I-VITE, TAB 1000-60-2MG (60/BT) SCH TAB: TAB at 09:25

## 2021-05-17 RX ADMIN — INSULIN DETEMIR SCH UNITS: 100 INJECTION, SOLUTION SUBCUTANEOUS at 20:52

## 2021-05-17 RX ADMIN — INSULIN LISPRO SCH UNITS: 100 INJECTION, SOLUTION INTRAVENOUS; SUBCUTANEOUS at 16:34

## 2021-05-17 RX ADMIN — FUROSEMIDE SCH MG: 20 TABLET ORAL at 09:24

## 2021-05-17 RX ADMIN — Medication SCH EA: at 09:26

## 2021-05-17 RX ADMIN — SODIUM BICARBONATE SCH MG: 325 TABLET ORAL at 09:25

## 2021-05-17 RX ADMIN — DOCUSATE SODIUM SCH MG: 100 CAPSULE, LIQUID FILLED ORAL at 20:51

## 2021-05-17 RX ADMIN — Medication SCH EA: at 20:50

## 2021-05-17 RX ADMIN — PROBIOTIC PRODUCT - TAB SCH EA: TAB at 09:25

## 2021-05-17 RX ADMIN — LOSARTAN POTASSIUM SCH MG: 25 TABLET, FILM COATED ORAL at 09:25

## 2021-05-17 RX ADMIN — INSULIN LISPRO SCH UNITS: 100 INJECTION, SOLUTION INTRAVENOUS; SUBCUTANEOUS at 00:00

## 2021-05-17 RX ADMIN — INSULIN LISPRO SCH UNITS: 100 INJECTION, SOLUTION INTRAVENOUS; SUBCUTANEOUS at 12:31

## 2021-05-17 RX ADMIN — DOCUSATE SODIUM SCH MG: 100 CAPSULE, LIQUID FILLED ORAL at 09:00

## 2021-05-17 RX ADMIN — INSULIN LISPRO SCH UNITS: 100 INJECTION, SOLUTION INTRAVENOUS; SUBCUTANEOUS at 05:59

## 2021-05-17 RX ADMIN — SIMVASTATIN SCH MG: 20 TABLET, FILM COATED ORAL at 20:51

## 2021-05-17 NOTE — IPNPDOC
Text Note


Date of Service


The patient was seen on 5/17/21.





NOTE


SUBJECTIVE:


-Had noted asymptomatic bradycardia overnight to mid 30s, also had 2 noted 

transient pauses, one was 2 sec, the other was 4 sec, asymptomatic


-This morning she otherwise feels well and has no complaints





PHYSICAL EXAMINATION:


VITAL SIGNS: Please see below.


GENERAL APPEARANCE: 64 y/o W in no acute distress


HEENT: Resolving mild ecchymoses to left side of forehead. No swelling or 

lesion, EOMI, anicteric, MMM 


CARDIOVASCULAR: Regular rate, rhythm. 2/6 holosystolic murmur throughout 

precordium, loudest at RUSB and radiating to the carotids


LUNGS: CTAB


ABDOMEN: Normoactive sounds, soft, NTND


EXTREMITIES: No peripheral edema. No overlying skin changes. Pulses intact.


NEUROLOGICAL: Sensation intact. Normal gait, Speech clear. A+Ox3. No focal 

deficits


PSYCHIATRIC: Aox3, Mood and affect appear appropriate





LABORATORY DATA: Reviewed








IMAGING: 





CXR, CT Head performed at [place] unremarkable.





Carotid US:


There is mild plaquing and narrowing in both carotid bulbs extending into the 

internal


and external carotid arteries.  Luminal narrowing is less than 50%.  There is no


evidence of hemodynamically significant stenosis of either internal carotid 

artery.


Normal flow velocities are seen. The vertebral arteries demonstrate normal 

direction


of flow.





RIGHT                       LEFT


Peak systolic velocity ICA                   67.8 cm/s                     106.5

cm/s


End diastolic velocity ICA                   20.2 cm/s                     41.5 

cm/s


Peak systolic velocity CCA                  88.7 cm/s                     

163.7cm/s


Peak systolic velocity ECA                  102.2 cm/s                     87.6 

cm/s


ICA/CCA ratio                                       0.8 0.7





IMPRESSION:


Bilateral luminal narrowing of the internal carotid arteries less than 50%.  No


evidence of hemodynamically significant stenosis.





MICROBIOLOGY: Please see below. 





ASSESSMENT: 64 y/o W with a pmh of htn, gerd, cdiff, kidney transplant, and DM1 

d/t pancreatic resection who presented to [facility] ED after a syncopal 

episode after a few alcoholic drinks and some marijuana use before becoming 

dizzy and passing out in her chair and transferred to [facility] for syncope w/u and 

management of patient's complicated chronic conditions.





PLAN:


Syncope: Most likely d/t concomitant alcohol and marijuana use vs. dual A/V 

tatiana blockade with michele and pauses noted on tele


-TTE read pending


-Carotid US showed no clinically significant stenosis that could be responsible 

for syncope


-Telemetry


-PT/OT


-Orthostatic vital signs


-DC metop and dilt








2. S/P renal transplant


- renal function at baseline 


- Continue immunosuppressive mycophenalate, prednisone


- nephrology consult





3. DM1


- sliding scale, hypoglycemic protocol


- continue at home basal insulin





4. HTN


- Discontinue metoprolol and dilt


- Continue cozaar at 77 and add amlodipine 10mg daily





5. HLD


- continue simvastatin





DVT prophylaxis


- Teds and SCDs.





VS,Fishbone, I+O


VS, Fishbone, I+O


Laboratory Tests


5/17/21 03:24











Vital Signs








  Date Time  Temp Pulse Resp B/P (MAP) Pulse Ox O2 Delivery O2 Flow Rate FiO2


 


5/17/21 09:25    180/82    


 


5/17/21 09:00  58      


 


5/17/21 05:59 98.6  16  97 Room Air  














I&O- Last 24 Hours up to 6 AM 


 


 5/17/21





 06:00


 


Intake Total 1320 ml


 


Output Total 450 ml


 


Balance 870 ml

















CARRIE MART MD   May 17, 2021 11:40

## 2021-05-17 NOTE — ECGEPIP
[facility]

                                       

                                       Test Date:    2021

Pat Name:     JESUS BARAHONA              Department:   

Patient ID:   [id number]                 Room:         [place] [id number]

Gender:       Female                   Technician:   DOREEN

:          1955               Requested By: RANJIT QUIROZ

Order Number: [id number]     Reading MD:   Vishnu Solano

                                 Measurements

Intervals                              Axis          

Rate:         34                       P:            78

TN:           162                      QRS:          78

QRSD:         88                       T:            72

QT:           456                                    

QTc:          342                                    

                           Interpretive Statements

Marked sinus bradycardia with premature atrial complexes

Nonspecific ST and T wave abnormality

Rate of two previous tracings showed sinus michele, but the rate for this

tracing i

is lower

Electronically Signed on 2021 19:30:18 EDT by Vishnu Solano

## 2021-05-17 NOTE — IPN
PROGRESS NOTE





DATE:  05/17/2021



SUBJECTIVE:  The patient was seen and examined at the bedside today morning. I

was told by the nursing staff that the patient was having some events from the

tele-monitor.  She was having bradycardia at night time and she had two pauses,

one was 2 seconds and the other one was 4 seconds.  The patient does admit that

she snores when she sleeps at night, but she does not have any established

diagnosis of her sleep apnea.  She is also on 2 heart rate limiting medications

including Cardizem and metoprolol.  She denies any history of any atrial

fibrillation or irregular heart rate, so metoprolol was stopped by the medical

team today.  Blood pressures are also slightly elevated today in the morning. 



OBJECTIVE: Vital signs:  Temperature is 98.6 degrees Fahrenheit, blood pressure

is 180/82, pulse is 58, respiratory rate of 16, saturating 97% on room air. 

Intake and output:  Urine output recorded as 1250 ml yesterday. She has had 2

voids since overnight. Weight in the bed scale 52.2 kg yesterday.



PHYSICAL EXAMINATION:  The patient is awake, alert and oriented x3, lying in

bed in no apparent distress. Head and neck examination:   Extraocular muscles

are intact. Pupils equally round and reactive to light.  Mucous membranes are

moist.  Neck is supple. There is no jugular venous distention (JVD).

Cardiovascular:  S1, S2,  bradycardia was noted.  Irregular rate. No edema in

the bilateral lower extremities. Respiratory:  Chest is clear to auscultation

bilaterally.  Bilateral equal air entry.  No rales or rhonchi.  Abdomen:  Soft.

Positive bowel sounds.  Nontender.  No organomegaly.  Musculoskeletal:  No

clubbing or cyanosis. Pulses are 2+.  CNS:  No focal deficit.  Power is 5/5 in

all extremities.  



LABORATORY REVIEW: Complete blood count (CBC) showed a WBC of 10.3, hemoglobin

10.2, platelets 233.  Basic metabolic panel (BMP) showed sodium 143, potassium

4.7, chloride 115, bicarbonate 21, BUN 27, creatinine is 0.88. Albumin is 3.4.  



CURRENT INPATIENT MEDICATIONS: The patient's medications were all reviewed by

myself.  Her metoprolol has been stopped by the medical team. Losartan dose has

been increased to 75 mg by mouth daily. She continues to be on diltiazem 120 mg

by mouth twice a day.  She is also on Lasix 20 mg by mouth daily.  No other

significant change in the medications today as compared with yesterday.   



ASSESSMENT AND PLAN: 

  1.  Renal allograft status. The patient's renal allograft function is at

      baseline. It is okay to continue current immunosuppression with

      mycophenolate and prednisone.  She gets belatacept infusion as an

      outpatient. 

  2.  Syncope and bradycardia on the tele-monitor.  The patient's metoprolol

      has been stopped; and if bradycardia persists, her Cardizem will also

      need to be stopped.  Both of these medications slow down the heart rate. 

      Electrolytes are within the acceptable range. 

  3.  Hypertension.  The patient's blood pressure is uncontrolled at this time.

      Metoprolol is being stopped because of bradycardia. Losartan dose has been

      increased.  If her bradycardia does not improve, then Cardizem can be

      stopped and she can be started on amlodipine 5 mg by mouth daily. Okay to

      continue Lasix at this time.

  4.  Insulin dependent diabetes.  Continue current insulin regimen.  Glucose

      level was in acceptable range.  

  5.  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 Hemoglobin is

      better at 10.2 at this time.  No need of TU administration.

## 2021-05-18 VITALS — SYSTOLIC BLOOD PRESSURE: 155 MMHG | DIASTOLIC BLOOD PRESSURE: 75 MMHG

## 2021-05-18 VITALS — SYSTOLIC BLOOD PRESSURE: 169 MMHG | DIASTOLIC BLOOD PRESSURE: 83 MMHG

## 2021-05-18 VITALS — SYSTOLIC BLOOD PRESSURE: 165 MMHG | DIASTOLIC BLOOD PRESSURE: 82 MMHG

## 2021-05-18 RX ADMIN — I-VITE, TAB 1000-60-2MG (60/BT) SCH TAB: TAB at 09:07

## 2021-05-18 RX ADMIN — FUROSEMIDE SCH MG: 20 TABLET ORAL at 09:08

## 2021-05-18 RX ADMIN — SODIUM BICARBONATE SCH MG: 325 TABLET ORAL at 09:06

## 2021-05-18 RX ADMIN — DOCUSATE SODIUM SCH MG: 100 CAPSULE, LIQUID FILLED ORAL at 09:00

## 2021-05-18 RX ADMIN — INSULIN LISPRO SCH UNITS: 100 INJECTION, SOLUTION INTRAVENOUS; SUBCUTANEOUS at 09:26

## 2021-05-18 RX ADMIN — INSULIN LISPRO SCH UNITS: 100 INJECTION, SOLUTION INTRAVENOUS; SUBCUTANEOUS at 07:30

## 2021-05-18 RX ADMIN — Medication SCH EA: at 09:07

## 2021-05-18 RX ADMIN — LOSARTAN POTASSIUM SCH MG: 25 TABLET, FILM COATED ORAL at 09:09

## 2021-05-18 RX ADMIN — PROBIOTIC PRODUCT - TAB SCH EA: TAB at 09:06

## 2021-05-18 RX ADMIN — DOCUSATE SODIUM SCH MG: 100 CAPSULE, LIQUID FILLED ORAL at 09:06

## 2021-05-18 NOTE — DS.PDOC
Discharge Summary


General


Date of Admission


May 16, 2021 at 02:12


Date of Discharge


5/18/2021


Attending Physician:  VERENICE CASTRO MD





Discharge Summary


PROCEDURES PERFORMED DURING STAY: None.





ADMITTING DIAGNOSES: 


Syncope


S/P renal transplant


Type 1 Diabetes mellitus


HTN


HLD





DISCHARGE DIAGNOSES:


Syncope 2/2 bradycardia


S/P renal transplant


Type 1 Diabetes mellitus


HTN


HLD





COMPLICATIONS/CHIEF COMPLAINT: Syncope, Polysubstance Abuse.





HISTORY OF PRESENT ILLNESS: 65 year old female who presented initially to 

[facility] ED for evaluation after a syncopal episode at home. Patient 

reported having two cans of beer along with smoking marijuana with her family. 

She then reported dizziness and woke up on the floor. According to family the 

patient fell forward in her chair and hit her head on the table. Family then 

brought the patient down to the floor and started chest compressions as they 

were concerned she was having a cardiac event. The patient reports waking up 

during this and feeling groggy after waking. She was brought by EMS to [facility] ED and transferred to [facility] for further evaluation after an unremarkable ED 

work up. 





HOSPITAL COURSE: Patient was admitted to [facility] and worked up for syncopal episode.

She was found to have bradycardia on telemetry monitoring. Patient's home 

medications included both metoprolol and diltiazem for blood pressure control. 

Her metoprolol was discontinued and she was started on amlodipine for blood 

pressure control. She continued to be bradycardic overnight down to 40bpm. Her 

diltiazem was also discontinued at this time. She was titrated up on her home 

losartan to add to blood pressure control. She will stop taking both metoprolol 

and diltiazem at home. She was discharged on a higher dose of losartan as well 

as on amlodipine. She will follow up with her PCP as well as Nephrology 

regarding her blood pressure control.





DISCHARGE MEDICATIONS: Please see below.


 


ALLERGIES: Please see below.





PHYSICAL EXAMINATION ON DISCHARGE:


VITAL SIGNS: Please see below.


GENERAL: Alert, comfortable, in no acute distress


HEENT: Normocephalic, atraumatic, sclera anicteric, moist mucous membranes


NECK: Supple, trachea midline, no lymphadenopathy


CARDIOVASCULAR: Regular rate and rhythm, normal S1 and S2. 2/6 holosystolic 

murmur at the RUSB


RESPIRATORY: Clear to auscultation bilaterally with equal air entry bilaterally.

No wheezing, rhonchi, or rales.


ABDOMEN: Soft, nontender, nondistended, bowel sounds present, no masses or 

hepatosplenomegaly appreciated


EXTREMITIES: No cyanosis or edema. Pulses 2+/4 in bilateral upper and lower 

extremities


NEUROLOGIC: Alert and oriented x3 to person, place and time. No focal deficits 

appreciated


PSYCHIATRIC: Mood and affect appropriate





LABORATORY DATA: Please see below.





IMAGING: 


- Carotid artery U/S


   Bilateral luminal narrowing of the internal carotid arteries less than 50%. 

No evidence of hemodynamically significant stenosis.





PROGNOSIS: Fair





ACTIVITY: As tolerated.





DIET: Consistent carb diet





DISCHARGE PLAN: Outpatient follow up with nephrology





DISPOSITION: Home





DISCHARGE INSTRUCTIONS:


1. Follow up with your PCP in 7-10 days.


2. Follow up with Nephrology in 2 weeks.


3. Stop taking Diltiazem and Metoprolol.


4. Start taking Amlodipine and Losartan.


5. If your symptoms return please call your PCP or return to the ED for further 

evaluation.





ITEMS TO FOLLOWUP ON ON OUTPATIENT:


None





DISCHARGE CONDITION: Stable.





TIME SPENT ON DISCHARGE: 


35 minutes.





Vital Signs/I&Os





Vital Signs








  Date Time  Temp Pulse Resp B/P (MAP) Pulse Ox O2 Delivery O2 Flow Rate FiO2


 


5/18/21 11:10  69  165/82 (109)    


 


5/18/21 06:00 97.7  20  98 Room Air  














I&O- Last 24 Hours up to 6 AM 


 


 5/18/21





 06:00


 


Intake Total 840 ml


 


Output Total 0 ml


 


Balance 840 ml











Laboratory Data


Labs 24H


Laboratory Tests 2


5/17/21 16:23: Bedside Glucose (Misc Panel) 228H


5/17/21 18:20: Bedside Glucose (Misc Panel) 41L


5/17/21 18:33: Bedside Glucose (Misc Panel) 61L


5/17/21 18:44: Bedside Glucose (Misc Panel) 88


5/17/21 20:00: Bedside Glucose (Misc Panel) 193H


5/18/21 06:25: Bedside Glucose (Misc Panel) 95


5/18/21 09:14: Bedside Glucose (Misc Panel) 193H


FSBS





Laboratory Tests








Test


 5/17/21


16:23 5/17/21


18:20 5/17/21


18:33 5/17/21


18:44 Range/Units


 


 


Bedside Glucose (Misc Panel) 228 41 61 88   MG/DL


 


Test


 5/17/21


20:00 5/18/21


06:25 5/18/21


09:14 


 Range/Units


 


 


Bedside Glucose (Misc Panel) 193 95 193    MG/DL











Discharge Medications


Scheduled


Amlodipine Besylate (Amlodipine Besylate) 10 Mg Tablet, 10 MG PO DAILY


B2/Vits A,C,E/Lut/Zeaxanth/Min (Icaps Tablet) 1 Each Tablet.er, 1 TAB PO DAILY, 

(Reported)


Belatacept IV (Nulojix) 250 Mg Vial, 250 MG IV QMONTH, (Reported)


   BEGINNING OF MONTH 


Ergocalciferol (Vitamin D2) (Vitamin D2) 50,000 Units Cap, 50,000 UNITS PO 

QWEEK, (Reported)


   FRIDAY 


Furosemide (Lasix) 20 Mg Tablet, 20 MG PO DAILY, (Reported)


Insulin Glargine,Hum.rec.anlog (Lantus Solostar) 100 Unit/Ml Inj, 14 UNITS SC 

QHS, (Reported)


   12-14 UNITS 


Insulin Human Lispro (Humalog) 1 Units/0.01 Ml Inj, 1 DOSE SC AC, (Reported)


L.acidoph/L.rhamn/B.bif/B.long (Probiotic Acidophilus Biobeads) 1 Cap Cap, 1 CAP

PO DAILY, (Reported)


Losartan Potassium (Losartan Potassium) 100 Mg Tablet, 100 MG PO DAILY


Mycophenolate Sodium (Mycophenolic Acid) 180 Mg Tab, 540 MG PO BID, (Reported)


Prednisone (Prednisone) 5 Mg Tab, 5 MG PO QHS, (Reported)


Simvastatin (Simvastatin) 20 Mg Tab, 20 MG PO QHS, (Reported)


Sodium Bicarbonate (Sodium Bicarbonate) 325 Mg Tablet, 325 MG PO DAILY, 

(Reported)





Scheduled PRN


Acetaminophen (Acetaminophen) 325 Mg Tablet, 650 MG PO Q4H PRN for PAIN / FEVER,

(Reported)





Allergies


Coded Allergies:  


     triazolam (Verified  Allergy, Mild, RASH, 5/15/20)


     hydralazine (Verified  Allergy, Unknown, 5/15/20)


     nifedipine (Verified  Adverse Reaction, Mild, TACHYCARDIA, 5/15/20)





GME ATTESTATION


GME ATTESTATION


My faculty preceptor for this patient encounter was physically present during 

the encounter and was fully available. All aspects of the patient interview, 

examination, medical decision making process, and medical care plan development 

were reviewed and approved by the faculty preceptor. The faculty preceptor is 

aware and concurs with the plan as stated in the body of this note and will 

attest to such by his/her cosignature.





ATTENDING NOTE


I, Verenice Castro, have independently examined this patient and performed my own 

physical exam, as well as reviewed the documentation and edited where necessary.

I have discussed in detail with the resident / student the findings and plan of 

treatment as documented by the resident / student and edited their note. I agree

with their findings and treatment plan and have edited their documentation. I 

will continue to follow the patient during this hospital stay.





Time spent on discharge 20 minutes











ANDRIA SPAIN D.O.        May 18, 2021 11:59


VERENICE CASTRO MD                May 18, 2021 14:40

## 2021-05-18 NOTE — IPN
PROGRESS NOTE



DATE:  05/18/2021



SUBJECTIVE: The patient was seen and examined at the bedside today morning. She

is afebrile and hemodynamically stable. She was started on amlodipine

yesterday. She only got one dose of Cardizem yesterday because of bradycardia.

She otherwise feels better and feels like she is ready to go home. 



OBJECTIVE: 

VITAL SIGNS: Temperature is 97.7 degrees Fahrenheit, blood pressure 155/75,

pulse 62, respiratory rate 20, saturating 98% on room air. 

INTAKE AND OUTPUT: Urine output is not recorded. She has had seven voids

yesterday. Weight on the bed scale is not available.

GENERAL: The patient is awake, alert, and oriented x3. Sitting up in the bed in

no apparent distress.

HEAD AND NECK: Extraocular muscles are intact. Pupils equally round and

reactive to light. Mucous membranes are moist. Neck is supple. There is no JVD. 

CARDIOVASCULAR: S1, S2. Regular rate. No edema of the bilateral lower

extremities. 

RESPIRATORY: Chest is clear to auscultation bilaterally. Bilaterally equal air

entry. No rales or rhonchi.

ABDOMEN: Soft with positive bowel sounds, nontender, and no organomegaly. 

MUSCULOSKELETAL: No clubbing or cyanosis. Pulses are 2+.

CNS: No focal deficits. Power is 5/5 in all extremities. 



CURRENT INPATIENT MEDICATIONS: The patient's medications were all reviewed by

myself. She was started on amlodipine 10 mg daily. I have stopped her Cardizem

now. She continues to be on Lasix 20 mg p.o. daily. Losartan was increased to

75 mg p.o. daily yesterday. No other significant change in her medications

today as compared to yesterday. 



ASSESSMENT AND PLAN:

1.  Renal allograft status. Renal function is stable. Continue current

    immunosuppression with mycophenolate, prednisone, and belatacept. 

2.  Bradycardia and recent syncope. Metoprolol was stopped the day before

    yesterday. The Cardizem is being stopped today. Heart rate is better now.

3.  Hypertension. Blood pressures are still high. She was started on amlodipine

    10 mg daily. She is on Cardizem, which is a non-dihydropyridine calcium

    channel blocker; however, combination of dihydropyridine and

    non-dihydropyridine is usually avoided, that is why I have stopped the

    Cardizem and patient is getting bradycardic as well. I am going to increase

    the losartan dose to 100 mg p.o. daily.

4.  Insulin-dependent diabetes. Continue the insulin regimen. 

5.  Anemia and chronic kidney disease. Hemoglobin is more than 10. No need of

    TU administration at this time.

## 2021-05-18 NOTE — ECHO
DATE OF PROCEDURE: 05/16/2021



Age: 65 

Gender: Female 

Height: 160 cm  

Weight: 52 kg  



REFERRING PHYSICIAN: Vangie Cartagena MD and Catalina Pang M.D.

  

INDICATION: Syncope. 



MEASUREMENTS:

   IVS 0.9 cm

   LV 4.2 cm

   LVPW 1.0 cm

   LA 4.4 cm

   Aorta 3.2 cm

   RV 2.8 cm

   IVC 1.6 cm 

   Mitral E wave velocity 111 cm/s 

   Mitral A wave 81 cm/s 

   E prime septal 9.6 cm/s 

   E prime lateral 8.9 cm/s 



FINDINGS:

This study is of good technical quality. The patient is in sinus rhythm. 



Left ventricle has normal size and normal systolic function, estimated LVEF 60% 
to 65%. Computer calculated LVEF of 64%. Right ventricle is also normal size and
systolic function. Left atrium is at least mildly enlarged. Right atrium appears
normal. Aortic valve is mildly sclerotic, but it is tricuspid and has normal 
mobility. Mitral and tricuspid valves appear normal. Pulmonic valve was not well
seen. No pericardial effusion is noted. Inferior vena cava is of normal size and
appropriately collapses with inspiration suggestive of normal central venous 
pressure. The aortic root is normal. The aortic arch and abdominal aorta were 
poorly visualized. 



Doppler interrogation of the aortic valve reveals no significant stenosis or 
insufficiency. 

There is trace mitral and trace tricuspid insufficiency. Calculated pulmonary 
artery pressure was close to 30 mmHg corresponding to borderline pulmonary 
hypertension. 



Mitral inflow pattern and tissue Doppler imaging of the mitral annulus revealed 
likely normal diastolic function. 



CONCLUSIONS: 

1.  Study is of acceptable technical quality, underlying sinus rhythm. 

2.  Normal LV size, systolic function. Probably normal diastolic function.

3.  No significant valvular disease. 

4.  Normal central venous pressure and possibly borderline pulmonary 
hypertension.

MTDD

## 2021-06-01 ENCOUNTER — HOSPITAL ENCOUNTER (OUTPATIENT)
Dept: HOSPITAL 53 - M INFU | Age: 66
Discharge: HOME | End: 2021-06-01
Attending: INTERNAL MEDICINE
Payer: MEDICARE

## 2021-06-01 VITALS — SYSTOLIC BLOOD PRESSURE: 154 MMHG | DIASTOLIC BLOOD PRESSURE: 78 MMHG

## 2021-06-01 VITALS — DIASTOLIC BLOOD PRESSURE: 79 MMHG | SYSTOLIC BLOOD PRESSURE: 151 MMHG

## 2021-06-01 VITALS — WEIGHT: 115.08 LBS | HEIGHT: 64 IN | BODY MASS INDEX: 19.65 KG/M2

## 2021-06-01 DIAGNOSIS — Z88.8: ICD-10-CM

## 2021-06-01 DIAGNOSIS — Z94.0: Primary | ICD-10-CM

## 2021-06-01 PROCEDURE — 96365 THER/PROPH/DIAG IV INF INIT: CPT

## 2021-07-01 ENCOUNTER — HOSPITAL ENCOUNTER (OUTPATIENT)
Dept: HOSPITAL 53 - M INFU | Age: 66
Discharge: HOME | End: 2021-07-01
Attending: INTERNAL MEDICINE
Payer: MEDICARE

## 2021-07-01 VITALS — WEIGHT: 115.08 LBS | BODY MASS INDEX: 19.65 KG/M2 | HEIGHT: 64 IN

## 2021-07-01 VITALS — DIASTOLIC BLOOD PRESSURE: 85 MMHG | SYSTOLIC BLOOD PRESSURE: 171 MMHG

## 2021-07-01 VITALS — DIASTOLIC BLOOD PRESSURE: 75 MMHG | SYSTOLIC BLOOD PRESSURE: 151 MMHG

## 2021-07-01 DIAGNOSIS — Z88.8: ICD-10-CM

## 2021-07-01 DIAGNOSIS — Z94.0: Primary | ICD-10-CM

## 2021-07-01 PROCEDURE — 96365 THER/PROPH/DIAG IV INF INIT: CPT

## 2021-08-02 ENCOUNTER — HOSPITAL ENCOUNTER (OUTPATIENT)
Dept: HOSPITAL 53 - M INFU | Age: 66
Discharge: HOME | End: 2021-08-02
Attending: INTERNAL MEDICINE
Payer: MEDICARE

## 2021-08-02 VITALS — BODY MASS INDEX: 19.03 KG/M2 | WEIGHT: 114.2 LBS | HEIGHT: 65 IN

## 2021-08-02 VITALS — SYSTOLIC BLOOD PRESSURE: 157 MMHG | DIASTOLIC BLOOD PRESSURE: 77 MMHG

## 2021-08-02 VITALS — SYSTOLIC BLOOD PRESSURE: 168 MMHG | DIASTOLIC BLOOD PRESSURE: 81 MMHG

## 2021-08-02 DIAGNOSIS — Z88.8: ICD-10-CM

## 2021-08-02 DIAGNOSIS — Z94.0: Primary | ICD-10-CM

## 2021-08-02 PROCEDURE — 96365 THER/PROPH/DIAG IV INF INIT: CPT

## 2021-08-11 ENCOUNTER — HOSPITAL ENCOUNTER (OUTPATIENT)
Dept: HOSPITAL 53 - M LAB REF | Age: 66
End: 2021-08-11
Attending: INTERNAL MEDICINE
Payer: MEDICARE

## 2021-08-11 DIAGNOSIS — Z94.0: ICD-10-CM

## 2021-08-11 DIAGNOSIS — E83.42: Primary | ICD-10-CM

## 2021-09-01 ENCOUNTER — HOSPITAL ENCOUNTER (OUTPATIENT)
Dept: HOSPITAL 53 - M INFU | Age: 66
Discharge: HOME | End: 2021-09-01
Attending: INTERNAL MEDICINE
Payer: MEDICARE

## 2021-09-01 VITALS — DIASTOLIC BLOOD PRESSURE: 64 MMHG | SYSTOLIC BLOOD PRESSURE: 141 MMHG

## 2021-09-01 VITALS — SYSTOLIC BLOOD PRESSURE: 141 MMHG | DIASTOLIC BLOOD PRESSURE: 64 MMHG

## 2021-09-01 VITALS — WEIGHT: 110.23 LBS | BODY MASS INDEX: 18.37 KG/M2 | HEIGHT: 65 IN

## 2021-09-01 VITALS — DIASTOLIC BLOOD PRESSURE: 80 MMHG | SYSTOLIC BLOOD PRESSURE: 162 MMHG

## 2021-09-01 DIAGNOSIS — Z94.0: Primary | ICD-10-CM

## 2021-09-01 DIAGNOSIS — Z88.8: ICD-10-CM

## 2021-09-01 PROCEDURE — 96365 THER/PROPH/DIAG IV INF INIT: CPT

## 2021-10-01 ENCOUNTER — HOSPITAL ENCOUNTER (OUTPATIENT)
Dept: HOSPITAL 53 - M INFU | Age: 66
Discharge: HOME | End: 2021-10-01
Attending: INTERNAL MEDICINE
Payer: MEDICARE

## 2021-10-01 VITALS — SYSTOLIC BLOOD PRESSURE: 131 MMHG | DIASTOLIC BLOOD PRESSURE: 69 MMHG

## 2021-10-01 VITALS — HEIGHT: 65 IN | BODY MASS INDEX: 18.37 KG/M2 | WEIGHT: 110.23 LBS

## 2021-10-01 VITALS — DIASTOLIC BLOOD PRESSURE: 80 MMHG | SYSTOLIC BLOOD PRESSURE: 163 MMHG

## 2021-10-01 DIAGNOSIS — Z88.8: ICD-10-CM

## 2021-10-01 DIAGNOSIS — Z94.0: Primary | ICD-10-CM

## 2021-10-01 PROCEDURE — 96365 THER/PROPH/DIAG IV INF INIT: CPT

## 2021-11-01 ENCOUNTER — HOSPITAL ENCOUNTER (OUTPATIENT)
Dept: HOSPITAL 53 - M INFU | Age: 66
Discharge: HOME | End: 2021-11-01
Attending: INTERNAL MEDICINE
Payer: MEDICARE

## 2021-11-01 VITALS — SYSTOLIC BLOOD PRESSURE: 170 MMHG | DIASTOLIC BLOOD PRESSURE: 85 MMHG

## 2021-11-01 VITALS — DIASTOLIC BLOOD PRESSURE: 77 MMHG | SYSTOLIC BLOOD PRESSURE: 151 MMHG

## 2021-11-01 VITALS — HEIGHT: 65 IN | WEIGHT: 112.22 LBS | BODY MASS INDEX: 18.7 KG/M2

## 2021-11-01 DIAGNOSIS — Z94.0: Primary | ICD-10-CM

## 2021-11-01 DIAGNOSIS — Z88.8: ICD-10-CM

## 2021-11-01 PROCEDURE — 96365 THER/PROPH/DIAG IV INF INIT: CPT

## 2021-12-01 ENCOUNTER — HOSPITAL ENCOUNTER (OUTPATIENT)
Dept: HOSPITAL 53 - M INFU | Age: 66
Discharge: HOME | End: 2021-12-01
Attending: INTERNAL MEDICINE
Payer: MEDICARE

## 2021-12-01 VITALS — SYSTOLIC BLOOD PRESSURE: 136 MMHG | DIASTOLIC BLOOD PRESSURE: 71 MMHG

## 2021-12-01 VITALS — DIASTOLIC BLOOD PRESSURE: 73 MMHG | SYSTOLIC BLOOD PRESSURE: 151 MMHG

## 2021-12-01 VITALS — BODY MASS INDEX: 18.7 KG/M2 | HEIGHT: 65 IN | WEIGHT: 112.22 LBS

## 2021-12-01 DIAGNOSIS — Z88.8: ICD-10-CM

## 2021-12-01 DIAGNOSIS — Z94.0: Primary | ICD-10-CM

## 2021-12-01 PROCEDURE — 96365 THER/PROPH/DIAG IV INF INIT: CPT

## 2021-12-02 ENCOUNTER — HOSPITAL ENCOUNTER (OUTPATIENT)
Dept: HOSPITAL 53 - M LAB REF | Age: 66
End: 2021-12-02
Attending: INTERNAL MEDICINE
Payer: MEDICARE

## 2021-12-02 DIAGNOSIS — E83.42: Primary | ICD-10-CM

## 2021-12-02 DIAGNOSIS — Z94.0: ICD-10-CM

## 2022-01-03 ENCOUNTER — HOSPITAL ENCOUNTER (OUTPATIENT)
Dept: HOSPITAL 53 - M INFU | Age: 67
Discharge: HOME | End: 2022-01-03
Attending: INTERNAL MEDICINE
Payer: MEDICARE

## 2022-01-03 VITALS — DIASTOLIC BLOOD PRESSURE: 89 MMHG | SYSTOLIC BLOOD PRESSURE: 179 MMHG

## 2022-01-03 VITALS — SYSTOLIC BLOOD PRESSURE: 165 MMHG | DIASTOLIC BLOOD PRESSURE: 84 MMHG

## 2022-01-03 DIAGNOSIS — Z94.0: Primary | ICD-10-CM

## 2022-01-03 DIAGNOSIS — Z88.8: ICD-10-CM

## 2022-01-03 PROCEDURE — 96365 THER/PROPH/DIAG IV INF INIT: CPT

## 2022-02-01 ENCOUNTER — HOSPITAL ENCOUNTER (OUTPATIENT)
Dept: HOSPITAL 53 - M INFU | Age: 67
Discharge: HOME | End: 2022-02-01
Attending: INTERNAL MEDICINE
Payer: MEDICARE

## 2022-02-01 VITALS — SYSTOLIC BLOOD PRESSURE: 178 MMHG | DIASTOLIC BLOOD PRESSURE: 82 MMHG

## 2022-02-01 VITALS — BODY MASS INDEX: 18.7 KG/M2 | WEIGHT: 112.22 LBS | HEIGHT: 65 IN

## 2022-02-01 VITALS — SYSTOLIC BLOOD PRESSURE: 167 MMHG | DIASTOLIC BLOOD PRESSURE: 77 MMHG

## 2022-02-01 DIAGNOSIS — Z94.0: Primary | ICD-10-CM

## 2022-02-01 DIAGNOSIS — Z88.8: ICD-10-CM

## 2022-02-01 PROCEDURE — 96365 THER/PROPH/DIAG IV INF INIT: CPT

## 2022-03-01 ENCOUNTER — HOSPITAL ENCOUNTER (OUTPATIENT)
Dept: HOSPITAL 53 - M INFU | Age: 67
Discharge: HOME | End: 2022-03-01
Attending: INTERNAL MEDICINE
Payer: MEDICARE

## 2022-03-01 VITALS — SYSTOLIC BLOOD PRESSURE: 169 MMHG | DIASTOLIC BLOOD PRESSURE: 83 MMHG

## 2022-03-01 VITALS — DIASTOLIC BLOOD PRESSURE: 80 MMHG | SYSTOLIC BLOOD PRESSURE: 167 MMHG

## 2022-03-01 DIAGNOSIS — Z88.8: ICD-10-CM

## 2022-03-01 DIAGNOSIS — Z94.0: Primary | ICD-10-CM

## 2022-03-01 PROCEDURE — 96365 THER/PROPH/DIAG IV INF INIT: CPT

## 2022-04-01 ENCOUNTER — HOSPITAL ENCOUNTER (OUTPATIENT)
Dept: HOSPITAL 53 - M INFU | Age: 67
Discharge: HOME | End: 2022-04-01
Attending: INTERNAL MEDICINE
Payer: MEDICARE

## 2022-04-01 VITALS — SYSTOLIC BLOOD PRESSURE: 152 MMHG | DIASTOLIC BLOOD PRESSURE: 88 MMHG

## 2022-04-01 VITALS — WEIGHT: 115.9 LBS | HEIGHT: 65 IN | BODY MASS INDEX: 19.31 KG/M2

## 2022-04-01 VITALS — SYSTOLIC BLOOD PRESSURE: 150 MMHG | DIASTOLIC BLOOD PRESSURE: 80 MMHG

## 2022-04-01 DIAGNOSIS — Z88.8: ICD-10-CM

## 2022-04-01 DIAGNOSIS — Z94.0: Primary | ICD-10-CM

## 2022-04-01 PROCEDURE — 96365 THER/PROPH/DIAG IV INF INIT: CPT

## 2022-05-02 ENCOUNTER — HOSPITAL ENCOUNTER (OUTPATIENT)
Dept: HOSPITAL 53 - M INFU | Age: 67
Discharge: HOME | End: 2022-05-02
Attending: INTERNAL MEDICINE
Payer: MEDICARE

## 2022-05-02 VITALS — HEIGHT: 62 IN | WEIGHT: 115.08 LBS | BODY MASS INDEX: 21.18 KG/M2

## 2022-05-02 VITALS — SYSTOLIC BLOOD PRESSURE: 160 MMHG | DIASTOLIC BLOOD PRESSURE: 81 MMHG

## 2022-05-02 VITALS — SYSTOLIC BLOOD PRESSURE: 173 MMHG | DIASTOLIC BLOOD PRESSURE: 86 MMHG

## 2022-05-02 DIAGNOSIS — Z94.0: Primary | ICD-10-CM

## 2022-05-02 DIAGNOSIS — Z88.8: ICD-10-CM

## 2022-05-02 PROCEDURE — 96365 THER/PROPH/DIAG IV INF INIT: CPT

## 2022-06-01 ENCOUNTER — HOSPITAL ENCOUNTER (OUTPATIENT)
Dept: HOSPITAL 53 - M INFU | Age: 67
Discharge: HOME | End: 2022-06-01
Attending: INTERNAL MEDICINE
Payer: MEDICARE

## 2022-06-01 VITALS — DIASTOLIC BLOOD PRESSURE: 77 MMHG | SYSTOLIC BLOOD PRESSURE: 145 MMHG

## 2022-06-01 VITALS — DIASTOLIC BLOOD PRESSURE: 76 MMHG | SYSTOLIC BLOOD PRESSURE: 156 MMHG

## 2022-06-01 DIAGNOSIS — Z88.8: ICD-10-CM

## 2022-06-01 DIAGNOSIS — Z94.0: Primary | ICD-10-CM

## 2022-06-01 PROCEDURE — 96365 THER/PROPH/DIAG IV INF INIT: CPT

## 2022-07-01 ENCOUNTER — HOSPITAL ENCOUNTER (OUTPATIENT)
Dept: HOSPITAL 53 - M INFU | Age: 67
Discharge: HOME | End: 2022-07-01
Attending: INTERNAL MEDICINE
Payer: MEDICARE

## 2022-07-01 VITALS — WEIGHT: 115.74 LBS | BODY MASS INDEX: 19.28 KG/M2 | HEIGHT: 65 IN

## 2022-07-01 VITALS — DIASTOLIC BLOOD PRESSURE: 75 MMHG | SYSTOLIC BLOOD PRESSURE: 128 MMHG

## 2022-07-01 VITALS — DIASTOLIC BLOOD PRESSURE: 67 MMHG | SYSTOLIC BLOOD PRESSURE: 117 MMHG

## 2022-07-01 DIAGNOSIS — Z94.0: Primary | ICD-10-CM

## 2022-07-01 DIAGNOSIS — Z88.8: ICD-10-CM

## 2022-07-01 PROCEDURE — 96365 THER/PROPH/DIAG IV INF INIT: CPT

## 2022-08-01 ENCOUNTER — HOSPITAL ENCOUNTER (OUTPATIENT)
Dept: HOSPITAL 53 - M INFU | Age: 67
Discharge: HOME | End: 2022-08-01
Attending: INTERNAL MEDICINE
Payer: MEDICARE

## 2022-08-01 VITALS — SYSTOLIC BLOOD PRESSURE: 134 MMHG | DIASTOLIC BLOOD PRESSURE: 80 MMHG

## 2022-08-01 VITALS
SYSTOLIC BLOOD PRESSURE: 155 MMHG | DIASTOLIC BLOOD PRESSURE: 76 MMHG | BODY MASS INDEX: 18.37 KG/M2 | HEIGHT: 65 IN | WEIGHT: 110.23 LBS

## 2022-08-01 DIAGNOSIS — Z94.0: Primary | ICD-10-CM

## 2022-08-01 DIAGNOSIS — Z88.8: ICD-10-CM

## 2022-08-01 PROCEDURE — 96365 THER/PROPH/DIAG IV INF INIT: CPT

## 2022-09-01 ENCOUNTER — HOSPITAL ENCOUNTER (OUTPATIENT)
Dept: HOSPITAL 53 - M INFU | Age: 67
Discharge: HOME | End: 2022-09-01
Attending: INTERNAL MEDICINE
Payer: MEDICARE

## 2022-09-01 VITALS — DIASTOLIC BLOOD PRESSURE: 76 MMHG | SYSTOLIC BLOOD PRESSURE: 160 MMHG

## 2022-09-01 VITALS — BODY MASS INDEX: 19.1 KG/M2 | HEIGHT: 65 IN | WEIGHT: 114.64 LBS

## 2022-09-01 DIAGNOSIS — Z88.8: ICD-10-CM

## 2022-09-01 DIAGNOSIS — Z94.0: Primary | ICD-10-CM

## 2022-09-01 PROCEDURE — 96365 THER/PROPH/DIAG IV INF INIT: CPT

## 2022-10-03 ENCOUNTER — HOSPITAL ENCOUNTER (OUTPATIENT)
Dept: HOSPITAL 53 - M INFU | Age: 67
Discharge: HOME | End: 2022-10-03
Attending: INTERNAL MEDICINE
Payer: MEDICARE

## 2022-10-03 VITALS
SYSTOLIC BLOOD PRESSURE: 150 MMHG | HEIGHT: 65 IN | DIASTOLIC BLOOD PRESSURE: 70 MMHG | BODY MASS INDEX: 17.86 KG/M2 | WEIGHT: 107.21 LBS

## 2022-10-03 DIAGNOSIS — Z94.0: Primary | ICD-10-CM

## 2022-10-03 DIAGNOSIS — Z88.8: ICD-10-CM

## 2022-10-03 PROCEDURE — 96365 THER/PROPH/DIAG IV INF INIT: CPT

## 2022-11-01 ENCOUNTER — HOSPITAL ENCOUNTER (OUTPATIENT)
Dept: HOSPITAL 53 - M INFU | Age: 67
Discharge: HOME | End: 2022-11-01
Attending: INTERNAL MEDICINE
Payer: MEDICARE

## 2022-11-01 VITALS — DIASTOLIC BLOOD PRESSURE: 73 MMHG | SYSTOLIC BLOOD PRESSURE: 145 MMHG

## 2022-11-01 DIAGNOSIS — Z88.8: ICD-10-CM

## 2022-11-01 DIAGNOSIS — Z94.0: Primary | ICD-10-CM

## 2022-11-01 PROCEDURE — 96365 THER/PROPH/DIAG IV INF INIT: CPT

## 2022-12-01 ENCOUNTER — HOSPITAL ENCOUNTER (OUTPATIENT)
Dept: HOSPITAL 53 - M INFU | Age: 67
Discharge: HOME | End: 2022-12-01
Attending: INTERNAL MEDICINE
Payer: MEDICARE

## 2022-12-01 VITALS
DIASTOLIC BLOOD PRESSURE: 71 MMHG | BODY MASS INDEX: 17.74 KG/M2 | SYSTOLIC BLOOD PRESSURE: 137 MMHG | HEIGHT: 65 IN | WEIGHT: 106.48 LBS

## 2022-12-01 DIAGNOSIS — Z88.8: ICD-10-CM

## 2022-12-01 DIAGNOSIS — Z94.0: Primary | ICD-10-CM

## 2022-12-01 PROCEDURE — 96365 THER/PROPH/DIAG IV INF INIT: CPT

## 2023-01-03 ENCOUNTER — HOSPITAL ENCOUNTER (OUTPATIENT)
Dept: HOSPITAL 53 - M INFU | Age: 68
Discharge: HOME | End: 2023-01-03
Attending: INTERNAL MEDICINE
Payer: MEDICARE

## 2023-01-03 VITALS — DIASTOLIC BLOOD PRESSURE: 85 MMHG | SYSTOLIC BLOOD PRESSURE: 180 MMHG

## 2023-01-03 VITALS — DIASTOLIC BLOOD PRESSURE: 80 MMHG | SYSTOLIC BLOOD PRESSURE: 167 MMHG

## 2023-01-03 DIAGNOSIS — Z94.0: Primary | ICD-10-CM

## 2023-01-03 DIAGNOSIS — Z88.8: ICD-10-CM

## 2023-01-03 PROCEDURE — 96365 THER/PROPH/DIAG IV INF INIT: CPT

## 2023-02-01 ENCOUNTER — HOSPITAL ENCOUNTER (OUTPATIENT)
Dept: HOSPITAL 53 - M INFU | Age: 68
Discharge: HOME | End: 2023-02-01
Attending: INTERNAL MEDICINE
Payer: MEDICARE

## 2023-02-01 VITALS — SYSTOLIC BLOOD PRESSURE: 151 MMHG | DIASTOLIC BLOOD PRESSURE: 76 MMHG

## 2023-02-01 VITALS
HEIGHT: 65 IN | DIASTOLIC BLOOD PRESSURE: 76 MMHG | SYSTOLIC BLOOD PRESSURE: 145 MMHG | WEIGHT: 110.23 LBS | BODY MASS INDEX: 18.37 KG/M2

## 2023-02-01 DIAGNOSIS — Z94.0: Primary | ICD-10-CM

## 2023-02-01 DIAGNOSIS — Z88.8: ICD-10-CM

## 2023-02-01 PROCEDURE — 96365 THER/PROPH/DIAG IV INF INIT: CPT

## 2023-03-01 ENCOUNTER — HOSPITAL ENCOUNTER (OUTPATIENT)
Dept: HOSPITAL 53 - M INFU | Age: 68
Discharge: HOME | End: 2023-03-01
Attending: INTERNAL MEDICINE
Payer: MEDICARE

## 2023-03-01 VITALS
HEIGHT: 66 IN | WEIGHT: 110.23 LBS | SYSTOLIC BLOOD PRESSURE: 159 MMHG | DIASTOLIC BLOOD PRESSURE: 79 MMHG | BODY MASS INDEX: 17.72 KG/M2

## 2023-03-01 VITALS — DIASTOLIC BLOOD PRESSURE: 75 MMHG | SYSTOLIC BLOOD PRESSURE: 149 MMHG

## 2023-03-01 DIAGNOSIS — Z88.8: ICD-10-CM

## 2023-03-01 DIAGNOSIS — Z94.0: Primary | ICD-10-CM

## 2023-03-01 PROCEDURE — 96365 THER/PROPH/DIAG IV INF INIT: CPT

## 2023-04-03 ENCOUNTER — HOSPITAL ENCOUNTER (OUTPATIENT)
Dept: HOSPITAL 53 - M INFU | Age: 68
Discharge: HOME | End: 2023-04-03
Attending: INTERNAL MEDICINE
Payer: MEDICARE

## 2023-04-03 VITALS — SYSTOLIC BLOOD PRESSURE: 180 MMHG | DIASTOLIC BLOOD PRESSURE: 90 MMHG

## 2023-04-03 VITALS — DIASTOLIC BLOOD PRESSURE: 86 MMHG | SYSTOLIC BLOOD PRESSURE: 183 MMHG

## 2023-04-03 VITALS — HEIGHT: 65 IN | WEIGHT: 115.74 LBS | BODY MASS INDEX: 19.28 KG/M2

## 2023-04-03 DIAGNOSIS — Z94.0: Primary | ICD-10-CM

## 2023-04-03 DIAGNOSIS — Z88.6: ICD-10-CM

## 2023-04-03 DIAGNOSIS — Z88.8: ICD-10-CM

## 2023-04-03 PROCEDURE — 96365 THER/PROPH/DIAG IV INF INIT: CPT

## 2023-05-01 ENCOUNTER — HOSPITAL ENCOUNTER (OUTPATIENT)
Dept: HOSPITAL 53 - M INFU | Age: 68
Discharge: HOME | End: 2023-05-01
Attending: INTERNAL MEDICINE
Payer: MEDICARE

## 2023-05-01 VITALS
SYSTOLIC BLOOD PRESSURE: 180 MMHG | BODY MASS INDEX: 19.28 KG/M2 | DIASTOLIC BLOOD PRESSURE: 84 MMHG | HEIGHT: 65 IN | WEIGHT: 115.74 LBS

## 2023-05-01 VITALS — DIASTOLIC BLOOD PRESSURE: 84 MMHG | SYSTOLIC BLOOD PRESSURE: 160 MMHG

## 2023-05-01 DIAGNOSIS — Z94.0: Primary | ICD-10-CM

## 2023-05-01 DIAGNOSIS — Z88.8: ICD-10-CM

## 2023-05-01 PROCEDURE — 96365 THER/PROPH/DIAG IV INF INIT: CPT

## 2023-06-01 ENCOUNTER — HOSPITAL ENCOUNTER (OUTPATIENT)
Dept: HOSPITAL 53 - M INFU | Age: 68
Discharge: HOME | End: 2023-06-01
Attending: INTERNAL MEDICINE
Payer: MEDICARE

## 2023-06-01 VITALS — DIASTOLIC BLOOD PRESSURE: 71 MMHG | SYSTOLIC BLOOD PRESSURE: 163 MMHG

## 2023-06-01 VITALS — BODY MASS INDEX: 19.28 KG/M2 | HEIGHT: 65 IN | WEIGHT: 115.74 LBS

## 2023-06-01 VITALS — SYSTOLIC BLOOD PRESSURE: 174 MMHG | DIASTOLIC BLOOD PRESSURE: 91 MMHG

## 2023-06-01 DIAGNOSIS — Z94.0: Primary | ICD-10-CM

## 2023-06-01 DIAGNOSIS — Z88.8: ICD-10-CM

## 2023-06-01 PROCEDURE — 96365 THER/PROPH/DIAG IV INF INIT: CPT

## 2023-07-05 ENCOUNTER — HOSPITAL ENCOUNTER (OUTPATIENT)
Dept: HOSPITAL 53 - M INFU | Age: 68
End: 2023-07-05
Attending: INTERNAL MEDICINE
Payer: MEDICARE

## 2023-07-05 VITALS
WEIGHT: 110.23 LBS | SYSTOLIC BLOOD PRESSURE: 161 MMHG | HEIGHT: 66 IN | DIASTOLIC BLOOD PRESSURE: 88 MMHG | OXYGEN SATURATION: 99 % | BODY MASS INDEX: 17.72 KG/M2

## 2023-07-05 DIAGNOSIS — Z88.8: ICD-10-CM

## 2023-07-05 DIAGNOSIS — Z94.0: Primary | ICD-10-CM

## 2023-07-05 PROCEDURE — 96365 THER/PROPH/DIAG IV INF INIT: CPT

## 2023-08-04 ENCOUNTER — HOSPITAL ENCOUNTER (OUTPATIENT)
Dept: HOSPITAL 53 - M INFU | Age: 68
Discharge: HOME | End: 2023-08-04
Attending: INTERNAL MEDICINE
Payer: MEDICARE

## 2023-08-04 VITALS — OXYGEN SATURATION: 97 % | SYSTOLIC BLOOD PRESSURE: 138 MMHG | DIASTOLIC BLOOD PRESSURE: 68 MMHG

## 2023-08-04 VITALS — HEIGHT: 66 IN | BODY MASS INDEX: 17.36 KG/M2 | WEIGHT: 108.03 LBS

## 2023-08-04 VITALS — DIASTOLIC BLOOD PRESSURE: 67 MMHG | OXYGEN SATURATION: 96 % | SYSTOLIC BLOOD PRESSURE: 128 MMHG

## 2023-08-04 DIAGNOSIS — Z88.8: ICD-10-CM

## 2023-08-04 DIAGNOSIS — Z94.0: Primary | ICD-10-CM

## 2023-08-04 PROCEDURE — 96365 THER/PROPH/DIAG IV INF INIT: CPT

## 2023-09-01 ENCOUNTER — HOSPITAL ENCOUNTER (OUTPATIENT)
Dept: HOSPITAL 53 - M INFU | Age: 68
Discharge: HOME | End: 2023-09-01
Attending: INTERNAL MEDICINE
Payer: MEDICARE

## 2023-09-01 VITALS — BODY MASS INDEX: 18.16 KG/M2 | HEIGHT: 65 IN | WEIGHT: 109 LBS

## 2023-09-01 VITALS — DIASTOLIC BLOOD PRESSURE: 71 MMHG | SYSTOLIC BLOOD PRESSURE: 119 MMHG | OXYGEN SATURATION: 97 %

## 2023-09-01 VITALS — DIASTOLIC BLOOD PRESSURE: 76 MMHG | OXYGEN SATURATION: 98 % | SYSTOLIC BLOOD PRESSURE: 162 MMHG

## 2023-09-01 DIAGNOSIS — Z88.2: ICD-10-CM

## 2023-09-01 DIAGNOSIS — Z88.8: ICD-10-CM

## 2023-09-01 DIAGNOSIS — Z94.0: Primary | ICD-10-CM

## 2023-09-01 PROCEDURE — 96365 THER/PROPH/DIAG IV INF INIT: CPT

## 2023-10-27 ENCOUNTER — HOSPITAL ENCOUNTER (OUTPATIENT)
Dept: HOSPITAL 53 - M INFU | Age: 68
End: 2023-10-27
Attending: INTERNAL MEDICINE
Payer: MEDICARE

## 2023-10-27 VITALS — SYSTOLIC BLOOD PRESSURE: 120 MMHG | DIASTOLIC BLOOD PRESSURE: 78 MMHG | OXYGEN SATURATION: 100 %

## 2023-10-27 VITALS — DIASTOLIC BLOOD PRESSURE: 93 MMHG | OXYGEN SATURATION: 99 % | SYSTOLIC BLOOD PRESSURE: 126 MMHG

## 2023-10-27 VITALS — HEIGHT: 64.5 IN | BODY MASS INDEX: 18.59 KG/M2 | WEIGHT: 110.23 LBS

## 2023-10-27 DIAGNOSIS — Z88.2: ICD-10-CM

## 2023-10-27 DIAGNOSIS — Z88.8: ICD-10-CM

## 2023-10-27 DIAGNOSIS — Z94.0: Primary | ICD-10-CM

## 2023-10-27 PROCEDURE — 96365 THER/PROPH/DIAG IV INF INIT: CPT
